# Patient Record
Sex: FEMALE | Race: WHITE | Employment: FULL TIME | URBAN - METROPOLITAN AREA
[De-identification: names, ages, dates, MRNs, and addresses within clinical notes are randomized per-mention and may not be internally consistent; named-entity substitution may affect disease eponyms.]

---

## 2017-02-16 ENCOUNTER — CARE COORDINATION (OUTPATIENT)
Dept: PULMONOLOGY | Facility: CLINIC | Age: 57
End: 2017-02-16

## 2017-02-16 DIAGNOSIS — J45.901 ASTHMA EXACERBATION: Primary | ICD-10-CM

## 2017-02-16 RX ORDER — PREDNISONE 20 MG/1
TABLET ORAL
Qty: 10 TABLET | Refills: 0 | Status: SHIPPED | OUTPATIENT
Start: 2017-02-16 | End: 2017-05-23

## 2017-02-16 NOTE — PROGRESS NOTES
Patient called stating that she was having an asthma flair and wanted a prescription for a prednisone burst.  Discussed with Dr. Perlman who ordered prednisone 40 mg daily for five days.  Patient is in agreement with this plan and verbalizes understanding.

## 2017-05-23 ENCOUNTER — OFFICE VISIT (OUTPATIENT)
Dept: RHEUMATOLOGY | Facility: CLINIC | Age: 57
End: 2017-05-23
Attending: INTERNAL MEDICINE
Payer: COMMERCIAL

## 2017-05-23 VITALS
BODY MASS INDEX: 23.06 KG/M2 | SYSTOLIC BLOOD PRESSURE: 113 MMHG | HEIGHT: 64 IN | OXYGEN SATURATION: 94 % | DIASTOLIC BLOOD PRESSURE: 73 MMHG | HEART RATE: 84 BPM | WEIGHT: 135.1 LBS

## 2017-05-23 DIAGNOSIS — J45.30 MILD PERSISTENT ASTHMA, UNCOMPLICATED: ICD-10-CM

## 2017-05-23 DIAGNOSIS — R12 HEARTBURN: Primary | ICD-10-CM

## 2017-05-23 DIAGNOSIS — M30.1 CHURG-STRAUSS SYNDROME (H): ICD-10-CM

## 2017-05-23 DIAGNOSIS — D72.18 CHURG-STRAUSS SYNDROME (H): ICD-10-CM

## 2017-05-23 DIAGNOSIS — Z79.60 LONG-TERM USE OF IMMUNOSUPPRESSANT MEDICATION: ICD-10-CM

## 2017-05-23 DIAGNOSIS — J45.901 ASTHMA EXACERBATION: ICD-10-CM

## 2017-05-23 LAB
ALBUMIN SERPL-MCNC: 4.1 G/DL (ref 3.4–5)
ALBUMIN UR-MCNC: NEGATIVE MG/DL
ALT SERPL W P-5'-P-CCNC: 18 U/L (ref 0–50)
APPEARANCE UR: CLEAR
AST SERPL W P-5'-P-CCNC: 20 U/L (ref 0–45)
BASOPHILS # BLD AUTO: 0.1 10E9/L (ref 0–0.2)
BASOPHILS NFR BLD AUTO: 1.1 %
BILIRUB UR QL STRIP: NEGATIVE
COLOR UR AUTO: COLORLESS
CREAT SERPL-MCNC: 0.88 MG/DL (ref 0.52–1.04)
DIFFERENTIAL METHOD BLD: ABNORMAL
EOSINOPHIL # BLD AUTO: 1 10E9/L (ref 0–0.7)
EOSINOPHIL NFR BLD AUTO: 12.7 %
ERYTHROCYTE [DISTWIDTH] IN BLOOD BY AUTOMATED COUNT: 13.6 % (ref 10–15)
GFR SERPL CREATININE-BSD FRML MDRD: 66 ML/MIN/1.7M2
GLUCOSE UR STRIP-MCNC: NEGATIVE MG/DL
HCT VFR BLD AUTO: 48.9 % (ref 35–47)
HGB BLD-MCNC: 16.2 G/DL (ref 11.7–15.7)
HGB UR QL STRIP: NEGATIVE
IMM GRANULOCYTES # BLD: 0 10E9/L (ref 0–0.4)
IMM GRANULOCYTES NFR BLD: 0.1 %
KETONES UR STRIP-MCNC: NEGATIVE MG/DL
LEUKOCYTE ESTERASE UR QL STRIP: NEGATIVE
LYMPHOCYTES # BLD AUTO: 0.4 10E9/L (ref 0.8–5.3)
LYMPHOCYTES NFR BLD AUTO: 5.7 %
MCH RBC QN AUTO: 31 PG (ref 26.5–33)
MCHC RBC AUTO-ENTMCNC: 33.1 G/DL (ref 31.5–36.5)
MCV RBC AUTO: 94 FL (ref 78–100)
MONOCYTES # BLD AUTO: 0.6 10E9/L (ref 0–1.3)
MONOCYTES NFR BLD AUTO: 8.4 %
NEUTROPHILS # BLD AUTO: 5.5 10E9/L (ref 1.6–8.3)
NEUTROPHILS NFR BLD AUTO: 72 %
NITRATE UR QL: NEGATIVE
NRBC # BLD AUTO: 0 10*3/UL
NRBC BLD AUTO-RTO: 0 /100
PH UR STRIP: 7 PH (ref 5–7)
PLATELET # BLD AUTO: 215 10E9/L (ref 150–450)
RBC # BLD AUTO: 5.23 10E12/L (ref 3.8–5.2)
RBC #/AREA URNS AUTO: 0 /HPF (ref 0–2)
SP GR UR STRIP: 1 (ref 1–1.03)
URN SPEC COLLECT METH UR: ABNORMAL
UROBILINOGEN UR STRIP-MCNC: 0 MG/DL (ref 0–2)
WBC # BLD AUTO: 7.6 10E9/L (ref 4–11)
WBC #/AREA URNS AUTO: 1 /HPF (ref 0–2)

## 2017-05-23 PROCEDURE — 82565 ASSAY OF CREATININE: CPT | Performed by: INTERNAL MEDICINE

## 2017-05-23 PROCEDURE — 36415 COLL VENOUS BLD VENIPUNCTURE: CPT | Performed by: INTERNAL MEDICINE

## 2017-05-23 PROCEDURE — 99212 OFFICE O/P EST SF 10 MIN: CPT | Mod: ZF

## 2017-05-23 PROCEDURE — 84460 ALANINE AMINO (ALT) (SGPT): CPT | Performed by: INTERNAL MEDICINE

## 2017-05-23 PROCEDURE — 85025 COMPLETE CBC W/AUTO DIFF WBC: CPT | Performed by: INTERNAL MEDICINE

## 2017-05-23 PROCEDURE — 84450 TRANSFERASE (AST) (SGOT): CPT | Performed by: INTERNAL MEDICINE

## 2017-05-23 PROCEDURE — 82040 ASSAY OF SERUM ALBUMIN: CPT | Performed by: INTERNAL MEDICINE

## 2017-05-23 PROCEDURE — 81001 URINALYSIS AUTO W/SCOPE: CPT | Performed by: INTERNAL MEDICINE

## 2017-05-23 RX ORDER — PREDNISONE 5 MG/1
5 TABLET ORAL DAILY
Qty: 90 TABLET | Refills: 1 | Status: SHIPPED | OUTPATIENT
Start: 2017-05-23 | End: 2017-05-31

## 2017-05-23 RX ORDER — AZATHIOPRINE 50 MG/1
50 TABLET ORAL DAILY
Qty: 90 TABLET | Refills: 1 | Status: SHIPPED | OUTPATIENT
Start: 2017-05-23 | End: 2017-05-31

## 2017-05-23 ASSESSMENT — PAIN SCALES - GENERAL: PAINLEVEL: NO PAIN (0)

## 2017-05-23 NOTE — NURSING NOTE
"Chief Complaint   Patient presents with     RECHECK     Follow up for Churg-Kip syndrome       Initial /73  Pulse 84  Ht 1.626 m (5' 4\")  Wt 61.3 kg (135 lb 1.6 oz)  SpO2 94%  BMI 23.19 kg/m2 Estimated body mass index is 23.19 kg/(m^2) as calculated from the following:    Height as of this encounter: 1.626 m (5' 4\").    Weight as of this encounter: 61.3 kg (135 lb 1.6 oz).  Medication Reconciliation: complete   Mary Carpenter CMA    "

## 2017-05-23 NOTE — MR AVS SNAPSHOT
After Visit Summary   5/23/2017    Angelina Trinh    MRN: [id number]           Patient Information     Date Of Birth          1960        Visit Information        Provider Department      5/23/2017 7:30 AM Master Mendieta MD [facility]        Today's Diagnoses     Heartburn    -  1    Asthma exacerbation        Churg-Kip syndrome (H)        Long-term use of immunosuppressant medication        Mild persistent asthma, uncomplicated           Follow-ups after your visit        Follow-up notes from your care team     Return in about 6 months (around 11/23/2017).      Your next 10 appointments already scheduled     May 23, 2017 10:45 AM CDT   Lab with  LAB   [facility] ([facility])    [address] [place] [id number]   [phone]            Aug 15, 2017  3:00 PM CDT   PFT VISIT with  PFL B   [facility] ([facility])    [address] [place] [id number]   [phone]            Aug 15, 2017  3:30 PM CDT   (Arrive by 3:15 PM)   Return Interstitial Lung with David Morris Perlman, MD   [facility] ([facility])    [address] [place] [id number] [phone]            Nov 14, 2017  9:30 AM CST   (Arrive by 9:15 AM)   Return Visit with Master Mendieta MD   [facility] ([facility])    [address] [place] [id number] [phone]              Who to contact     If you have questions or need follow up information about today's clinic visit or your schedule please contact [facility] directly at [phone].  Normal or non-critical lab and imaging results will be communicated to you by MyChart, letter or phone within 4 business days after the clinic has received the results. If you do not hear from us  "within 7 days, please contact the clinic through [employer] or phone. If you have a critical or abnormal lab result, we will notify you by phone as soon as possible.  Submit refill requests through [employer] or call your pharmacy and they will forward the refill request to us. Please allow 3 business days for your refill to be completed.          Additional Information About Your Visit        Bringg[employer]mbel Information     [employer] gives you secure access to your electronic health record. If you see a primary care provider, you can also send messages to your care team and make appointments. If you have questions, please call your primary care clinic.  If you do not have a primary care provider, please call [phone] and they will assist you.        Care EveryWhere ID     This is your Care EveryWhere ID. This could be used by other organizations to access your [place] medical records  [id number]        Your Vitals Were     Pulse Height Pulse Oximetry BMI (Body Mass Index)          84 1.626 m (5' 4\") 94% 23.19 kg/m2         Blood Pressure from Last 3 Encounters:   05/23/17 113/73   11/22/16 131/84   11/10/16 136/74    Weight from Last 3 Encounters:   05/23/17 61.3 kg (135 lb 1.6 oz)   11/22/16 60.9 kg (134 lb 3.2 oz)   11/10/16 61.7 kg (136 lb)              Today, you had the following     No orders found for display         Today's Medication Changes          These changes are accurate as of: 5/23/17 10:20 AM.  If you have any questions, ask your nurse or doctor.               Start taking these medicines.        Dose/Directions    omeprazole 20 MG CR capsule   Commonly known as:  priLOSEC   Used for:  Asthma exacerbation, Churg-Kip syndrome (H), Long-term use of immunosuppressant medication, Mild persistent asthma, uncomplicated, Heartburn   Started by:  Master Mendieta MD        Dose:  20 mg   Take 1 capsule (20 mg) by mouth daily   Quantity:  30 capsule   Refills:  0            Where to get your medicines    "   These medications were sent to [facility]/pharmacy #[id number] - [place], [place] - [address]  [address], [facility] [address]     Phone:  [phone]     azaTHIOprine 50 MG tablet    omeprazole 20 MG CR capsule    predniSONE 5 MG tablet                Primary Care Provider Office Phone # Fax #    John Smart [phone]       [place] [address] [address]        Thank you!     Thank you for choosing [facility] RHEUMATOLOG[facility]  for your care. Our goal is always to provide you with excellent care. Hearing back from our patients is one way we can continue to improve our services. Please take a few minutes to complete the written survey that you may receive in the mail after your visit with us. Thank you!             Your Updated Medication List - Protect others around you: Learn how to safely use, store and throw away your medicines at www.disposemymeds.org.          This list is accurate as of: 5/23/17 10:20 AM.  Always use your most recent med list.                   Brand Name Dispense Instructions for use    albuterol 108 (90 BASE) MCG/ACT Inhaler    albuterol    1 Inhaler    Inhale 1-2 puffs into the lungs every 6 hours as needed for shortness of breath / dyspnea Every 4-6 hours as needed and directed       ALLEGRA 180 MG tablet   Generic drug:  fexofenadine      Take 1 tablet by mouth daily as needed.       azaTHIOprine 50 MG tablet    IMURAN    90 tablet    Take 1 tablet (50 mg) by mouth daily TAKE BY MOUTH WITH FOOD.       budesonide 0.5 MG/2ML neb solution    PULMICORT    180 mL    Take 2 mLs by nebulization 2 times daily. Mix with NS and use as directed with Sinugator       fluticasone 220 MCG/ACT Inhaler    FLOVENT HFA    3 Inhaler    Inhale 2 puffs into the lungs 2 times daily 2 puffs       ipratropium 0.06 % spray    ATROVENT    1 Box    Spray 2 sprays into both nostrils 4 times daily as needed for rhinitis       omeprazole 20 MG CR capsule     priLOSEC    30 capsule    Take 1 capsule (20 mg) by mouth daily       predniSONE 5 MG tablet    DELTASONE    90 tablet    Take 1 tablet (5 mg) by mouth daily       vitamin D 2000 UNITS tablet      Take 1 tablet by mouth daily.       ZANTAC 75 PO      Take 1 tablet by mouth daily

## 2017-05-23 NOTE — PROGRESS NOTES
Ms. 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 This has been associated with asthma, eosinophilia (26%), chronic sinusitis and allergic rhinitis. P-ANCA positive. Treatment with Solumedrol 1 gm IV x 2 and Cyclophosphamide 1.25 gm IV infusion beginning on 12/3/2004, and discontinued after three doses.  She continues the azathioprine 50 mg daily for increases in eosinophilia.     Influenza A and sinus infection in the interval. She has a common cold today. She has some wheezing with the seasonal allergies, but no real SOB.     No complaints of loss of sensation or weakness. No joint, muscle or nerve pains.     She is now exercising regularly. She is eating a bit more and has gained some weight. She has changed her work situation recently, and now is floating. She seems to be having more night time heartburn, now daily.    Azathioprine 50 mg daily is well tolerated with infections as described. Prednisone is 5 mg to maintain good breathing function and control eosinophilia.    DEXA is planned for next month. She needs a colonoscopy.    PMH   Medical: Asthma since  requiring high dose prednisone at times. Chronic sinusitis and allergic/eosinophillic rhinitis for many years, Scoliosis, nasal polyps, ocular migraine, GERD, tympanic membrane rupture, posterior vitreous detachment right eye  Surgery: Cervical colposcopy; T and A; nasal polypectomy  Injuries: clavical fracture, rib fracture.      Family Hx   No asthma or inflammatory disease.  Father with type II diabetes, adult onset hydrocephalus, TIA/CVA, osteoarthritis  Brother and mother both  with brain tumor.   Aunt with bone cancer.   Sister with Grave's disease.  Sister with premature irritable bowel OA, HTN, and PVCs  Paternal Aunt with PMR.  Great grandmother with RA.     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  She dances.   no children. No EtOH or tobacco. Right handed.    PMSF history personally obtained and updated by me today.     ROS:  +post-menopausal  +daily GERD  +Allergy to PCN and SULFA  Remainder of the 14 point ROS obtained and found negative.     Physical Exam   Constitutional: WD-WN-WG cooperative, NAD  Eyes: PERRLA,  EOMI, anichteric sclera, nl conjunctiva  ENT: mouth clear, normal lips, teeth, gums, and sinus  Neck: no mass or thyroid enlargement, no JVD  Resp: lungs clear to auscultation, normal effort, nl to palpation  CV: RRR, no murmurs, rubs or gallops, no edema  GI: no ABD mass or tenderness, soft  Lymph: no cervical or epitrochlear nodes  MS: All TMJ, neck, shoulder, elbow, wrist, hand, spine, hip, knee, ankle, and foot joints were examined and otherwise found normal. Normal  strength. No active synovitis or deformity. Full strength and ROM.  Skin: no rashes or lesions, nl nails  Neuro: nl sensation and strength  Psych: nl judgement, orientation, memory, affect.    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 No signs of adverse event with the azathioprine and we will continue with this. Low dose prednisone to maintain good lung health.     Long term monitoring of immune suppression-plan laboratory testing today.    Hearburn-now daily and I will treat with daily omeprazole for 1 month.    Bone health-with DEXA planned.    RTC in 6 months.

## 2017-05-23 NOTE — LETTER
2017      RE: Angelina Trinh  [address] [place] [id number]       Ms. 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 This has been associated with asthma, eosinophilia (26%), chronic sinusitis and allergic rhinitis. P-ANCA positive. Treatment with Solumedrol 1 gm IV x 2 and Cyclophosphamide 1.25 gm IV infusion beginning on 12/3/2004, and discontinued after three doses.  She continues the azathioprine 50 mg daily for increases in eosinophilia.     Influenza A and sinus infection in the interval. She has a common cold today. She has some wheezing with the seasonal allergies, but no real SOB.     No complaints of loss of sensation or weakness. No joint, muscle or nerve pains.     She is now exercising regularly. She is eating a bit more and has gained some weight. She has changed her work situation recently, and now is floating. She seems to be having more night time heartburn, now daily.    Azathioprine 50 mg daily is well tolerated with infections as described. Prednisone is 5 mg to maintain good breathing function and control eosinophilia.    DEXA is planned for next month. She needs a colonoscopy.    PMH   Medical: Asthma since  requiring high dose prednisone at times. Chronic sinusitis and allergic/eosinophillic rhinitis for many years, Scoliosis, nasal polyps, ocular migraine, GERD, tympanic membrane rupture, posterior vitreous detachment right eye  Surgery: Cervical colposcopy; T and A; nasal polypectomy  Injuries: clavical fracture, rib fracture.      Family Hx   No asthma or inflammatory disease.  Father with type II diabetes, adult onset hydrocephalus, TIA/CVA, osteoarthritis  Brother and mother both  with brain tumor.   Aunt with bone cancer.   Sister with Grave's disease.  Sister with premature irritable bowel OA, HTN, and PVCs  Paternal Aunt with  PMR.  Great grandmother with RA.     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   no children. No EtOH or tobacco. Right handed.    PMSF history personally obtained and updated by me today.     ROS:  +post-menopausal  +daily GERD  +Allergy to PCN and SULFA  Remainder of the 14 point ROS obtained and found negative.     Physical Exam   Constitutional: WD-WN-WG cooperative, NAD  Eyes: PERRLA,  EOMI, anichteric sclera, nl conjunctiva  ENT: mouth clear, normal lips, teeth, gums, and sinus  Neck: no mass or thyroid enlargement, no JVD  Resp: lungs clear to auscultation, normal effort, nl to palpation  CV: RRR, no murmurs, rubs or gallops, no edema  GI: no ABD mass or tenderness, soft  Lymph: no cervical or epitrochlear nodes  MS: All TMJ, neck, shoulder, elbow, wrist, hand, spine, hip, knee, ankle, and foot joints were examined and otherwise found normal. Normal  strength. No active synovitis or deformity. Full strength and ROM.  Skin: no rashes or lesions, nl nails  Neuro: nl sensation and strength  Psych: nl judgement, orientation, memory, affect.    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 No signs of adverse event with the azathioprine and we will continue with this. Low dose prednisone to maintain good lung health.     Long term monitoring of immune suppression-plan laboratory testing today.    Hearburn-now daily and I will treat with daily omeprazole for 1 month.    Bone health-with DEXA planned.    RTC in 6 months.      Master Mendieta MD

## 2017-05-30 ENCOUNTER — MYC MEDICAL ADVICE (OUTPATIENT)
Dept: RHEUMATOLOGY | Facility: CLINIC | Age: 57
End: 2017-05-30

## 2017-05-30 DIAGNOSIS — M30.1 CHURG-STRAUSS SYNDROME (H): ICD-10-CM

## 2017-05-30 DIAGNOSIS — J45.901 ASTHMA EXACERBATION: ICD-10-CM

## 2017-05-30 DIAGNOSIS — Z79.60 LONG-TERM USE OF IMMUNOSUPPRESSANT MEDICATION: ICD-10-CM

## 2017-05-30 DIAGNOSIS — D72.18 CHURG-STRAUSS SYNDROME (H): ICD-10-CM

## 2017-05-30 DIAGNOSIS — R12 HEARTBURN: ICD-10-CM

## 2017-05-30 DIAGNOSIS — J45.30 MILD PERSISTENT ASTHMA, UNCOMPLICATED: ICD-10-CM

## 2017-06-01 RX ORDER — AZATHIOPRINE 50 MG/1
50 TABLET ORAL DAILY
Qty: 90 TABLET | Refills: 1 | Status: SHIPPED | OUTPATIENT
Start: 2017-06-01 | End: 2017-11-14

## 2017-06-01 RX ORDER — PREDNISONE 5 MG/1
5 TABLET ORAL DAILY
Qty: 90 TABLET | Refills: 1 | Status: SHIPPED | OUTPATIENT
Start: 2017-06-01 | End: 2017-11-14

## 2017-07-20 DIAGNOSIS — M30.1 CHURG-STRAUSS SYNDROME (H): ICD-10-CM

## 2017-07-20 DIAGNOSIS — J84.9 ILD (INTERSTITIAL LUNG DISEASE) (H): ICD-10-CM

## 2017-07-20 DIAGNOSIS — D72.18 CHURG-STRAUSS SYNDROME (H): ICD-10-CM

## 2017-07-20 RX ORDER — FLUTICASONE PROPIONATE 220 UG/1
2 AEROSOL, METERED RESPIRATORY (INHALATION) 2 TIMES DAILY
Qty: 3 INHALER | Refills: 3 | Status: SHIPPED | OUTPATIENT
Start: 2017-07-20 | End: 2017-10-17 | Stop reason: DRUGHIGH

## 2017-08-19 ENCOUNTER — HEALTH MAINTENANCE LETTER (OUTPATIENT)
Age: 57
End: 2017-08-19

## 2017-10-06 ENCOUNTER — OFFICE VISIT (OUTPATIENT)
Dept: PULMONOLOGY | Facility: CLINIC | Age: 57
End: 2017-10-06
Attending: INTERNAL MEDICINE
Payer: COMMERCIAL

## 2017-10-06 VITALS
DIASTOLIC BLOOD PRESSURE: 78 MMHG | WEIGHT: 134 LBS | HEART RATE: 88 BPM | BODY MASS INDEX: 22.88 KG/M2 | OXYGEN SATURATION: 96 % | SYSTOLIC BLOOD PRESSURE: 125 MMHG | RESPIRATION RATE: 16 BRPM | TEMPERATURE: 98.1 F | HEIGHT: 64 IN

## 2017-10-06 DIAGNOSIS — J84.9 ILD (INTERSTITIAL LUNG DISEASE) (H): ICD-10-CM

## 2017-10-06 DIAGNOSIS — J45.40 MODERATE PERSISTENT ASTHMA WITHOUT COMPLICATION: ICD-10-CM

## 2017-10-06 DIAGNOSIS — J45.40 MODERATE PERSISTENT ASTHMA WITHOUT COMPLICATION: Primary | ICD-10-CM

## 2017-10-06 PROCEDURE — 96372 THER/PROPH/DIAG INJ SC/IM: CPT | Mod: ZF

## 2017-10-06 PROCEDURE — 99212 OFFICE O/P EST SF 10 MIN: CPT | Mod: ZF

## 2017-10-06 PROCEDURE — 90686 IIV4 VACC NO PRSV 0.5 ML IM: CPT | Mod: ZF | Performed by: INTERNAL MEDICINE

## 2017-10-06 PROCEDURE — G0008 ADMIN INFLUENZA VIRUS VAC: HCPCS | Mod: ZF

## 2017-10-06 PROCEDURE — 25000128 H RX IP 250 OP 636: Mod: ZF | Performed by: INTERNAL MEDICINE

## 2017-10-06 RX ORDER — ALBUTEROL SULFATE 90 UG/1
1-2 AEROSOL, METERED RESPIRATORY (INHALATION) EVERY 6 HOURS PRN
Qty: 1 INHALER | Refills: 11 | Status: SHIPPED | OUTPATIENT
Start: 2017-10-06 | End: 2020-03-12

## 2017-10-06 RX ORDER — BUDESONIDE AND FORMOTEROL FUMARATE DIHYDRATE 160; 4.5 UG/1; UG/1
2 AEROSOL RESPIRATORY (INHALATION) 2 TIMES DAILY
Qty: 1 INHALER | Refills: 6 | Status: SHIPPED | OUTPATIENT
Start: 2017-10-06 | End: 2017-10-17

## 2017-10-06 RX ADMIN — INFLUENZA A VIRUS A/MICHIGAN/45/2015 X-275 (H1N1) ANTIGEN (FORMALDEHYDE INACTIVATED), INFLUENZA A VIRUS A/HONG KONG/4801/2014 X-263B (H3N2) ANTIGEN (FORMALDEHYDE INACTIVATED), INFLUENZA B VIRUS B/PHUKET/3073/2013 ANTIGEN (FORMALDEHYDE INACTIVATED), AND INFLUENZA B VIRUS B/BRISBANE/60/2008 ANTIGEN (FORMALDEHYDE INACTIVATED) 0.5 ML: 15; 15; 15; 15 INJECTION, SUSPENSION INTRAMUSCULAR at 07:36

## 2017-10-06 ASSESSMENT — PAIN SCALES - GENERAL: PAINLEVEL: NO PAIN (0)

## 2017-10-06 NOTE — MR AVS SNAPSHOT
After Visit Summary   10/6/2017    Angelina Trinh    MRN: [id number]           Patient Information     Date Of Birth          1960        Visit Information        Provider Department      10/6/2017 7:00 AM Perlman, David Morris, MD [facility]        Today's Diagnoses     Moderate persistent asthma without complication    -  1    ILD (interstitial lung disease) (H)           Follow-ups after your visit        Your next 10 appointments already scheduled     Nov 14, 2017  7:30 AM CST   (Arrive by 7:15 AM)   Return Visit with Master Mendieta MD   [facility] ([facility])    [address] [place] [id number] [phone]              Future tests that were ordered for you today     Open Future Orders        Priority Expected Expires Ordered    General PFT Lab (Please always keep checked) Routine  10/6/2018 10/6/2017    Pulmonary Function Test Routine  12/23/2026 10/6/2017    XR Chest 2 Views Routine 1/6/2018 10/6/2018 10/6/2017            Who to contact     If you have questions or need follow up information about today's clinic visit or your schedule please contact [facility] directly at [phone].  Normal or non-critical lab and imaging results will be communicated to you by MyChart, letter or phone within 4 business days after the clinic has received the results. If you do not hear from us within 7 days, please contact the clinic through idiohart or phone. If you have a critical or abnormal lab result, we will notify you by phone as soon as possible.  Submit refill requests through [employer]iry or call your pharmacy and they will forward the refill request to us. Please allow 3 business days for your refill to be completed.          Additional Information About Your Visit        idioharTigerstripe Information     [employer]iry gives you secure access to your electronic health record. If you  "see a primary care provider, you can also send messages to your care team and make appointments. If you have questions, please call your primary care clinic.  If you do not have a primary care provider, please call [phone] and they will assist you.        Care EveryWhere ID     This is your Care EveryWhere ID. This could be used by other organizations to access your [place] medical records  [id number]        Your Vitals Were     Pulse Temperature Respirations Height Pulse Oximetry BMI (Body Mass Index)    88 98.1  F (36.7  C) (Oral) 16 1.626 m (5' 4\") 96% 23 kg/m2       Blood Pressure from Last 3 Encounters:   10/06/17 125/78   05/23/17 113/73   11/22/16 131/84    Weight from Last 3 Encounters:   10/06/17 60.8 kg (134 lb)   05/23/17 61.3 kg (135 lb 1.6 oz)   11/22/16 60.9 kg (134 lb 3.2 oz)                 Today's Medication Changes          These changes are accurate as of: 10/6/17 11:40 AM.  If you have any questions, ask your nurse or doctor.               Start taking these medicines.        Dose/Directions    budesonide-formoterol 160-4.5 MCG/ACT Inhaler   Commonly known as:  SYMBICORT   Used for:  Moderate persistent asthma without complication   Started by:  Perlman, David Morris, MD        Dose:  2 puff   Inhale 2 puffs into the lungs 2 times daily   Quantity:  1 Inhaler   Refills:  6            Where to get your medicines      These medications were sent to [facility]/pharmacy #[id number] - [address] [address]     Phone:  [phone]     albuterol 108 (90 BASE) MCG/ACT Inhaler    budesonide-formoterol 160-4.5 MCG/ACT Inhaler                Primary Care Provider Office Phone # Fax #    John Smart [phone]       [address] [address]        Equal Access to Services     SANDY HACKETT AH: Debbie Montemayor, jenny farfan, neal floressh " lakinsey morel [facility] [phone].    ATENCIÓN: Si vijayla xochilt, tiene a adams disposición servicios gratuitos de asistencia lingüística. Sydney watkins [phone].    We comply with applicable federal civil rights laws and [place] laws. We do not discriminate on the basis of race, color, national origin, age, disability, sex, sexual orientation, or gender identity.            Thank you!     Thank you for choosing [facility]  for your care. Our goal is always to provide you with excellent care. Hearing back from our patients is one way we can continue to improve our services. Please take a few minutes to complete the written survey that you may receive in the mail after your visit with us. Thank you!             Your Updated Medication List - Protect others around you: Learn how to safely use, store and throw away your medicines at www.disposemymeds.org.          This list is accurate as of: 10/6/17 11:40 AM.  Always use your most recent med list.                   Brand Name Dispense Instructions for use Diagnosis    albuterol 108 (90 BASE) MCG/ACT Inhaler    PROAIR HFA    1 Inhaler    Inhale 1-2 puffs into the lungs every 6 hours as needed for shortness of breath / dyspnea Every 4-6 hours as needed and directed    ILD (interstitial lung disease) (H)       ALLEGRA 180 MG tablet   Generic drug:  fexofenadine      Take 1 tablet by mouth daily as needed.        azaTHIOprine 50 MG tablet    IMURAN    90 tablet    Take 1 tablet (50 mg) by mouth daily TAKE BY MOUTH WITH FOOD.    Churg-Kip syndrome (H), Long-term use of immunosuppressant medication, Mild persistent asthma, uncomplicated, Asthma exacerbation       budesonide 0.5 MG/2ML neb solution    PULMICORT    180 mL    Take 2 mLs by nebulization 2 times daily. Mix with NS and use as directed with Sinugator    Churg-Kip syndrome (H)       budesonide-formoterol 160-4.5 MCG/ACT Inhaler    SYMBICORT    1 Inhaler    Inhale 2 puffs into the  lungs 2 times daily    Moderate persistent asthma without complication       fluticasone 220 MCG/ACT Inhaler    FLOVENT HFA    3 Inhaler    Inhale 2 puffs into the lungs 2 times daily 2 puffs    Churg-Kip syndrome (H), ILD (interstitial lung disease) (H)       ipratropium 0.06 % spray    ATROVENT    1 Box    Spray 2 sprays into both nostrils 4 times daily as needed for rhinitis    Post-nasal drip       omeprazole 20 MG CR capsule    priLOSEC    30 capsule    Take 1 capsule (20 mg) by mouth daily    Asthma exacerbation, Churg-Kip syndrome (H), Long-term use of immunosuppressant medication, Mild persistent asthma, uncomplicated, Heartburn       predniSONE 5 MG tablet    DELTASONE    90 tablet    Take 1 tablet (5 mg) by mouth daily    Churg-Kip syndrome (H), Long-term use of immunosuppressant medication, Mild persistent asthma, uncomplicated, Asthma exacerbation       vitamin D 2000 UNITS tablet      Take 1 tablet by mouth daily.        ZANTAC 75 PO      Take 1 tablet by mouth daily    Asthma exacerbation, Churg-Kip syndrome (H), Long-term use of immunosuppressant medication, Mild persistent asthma, uncomplicated

## 2017-10-06 NOTE — NURSING NOTE
Chief Complaint   Patient presents with     Interstitial Lung Disease (ILD)     Follow up on Angelina and her ILD     Sarabjit Mares CMA at 6:55 AM on 10/6/2017

## 2017-10-06 NOTE — PROGRESS NOTES
Reason for Visit  Angelina Trinh is a 57 year old year old female who is being seen for Asthma and Churg-Kip  ILD HPI    Angelina Trinh is a 57-year-old female with moderate persistent asthma and Churg-Kip vasculitis who is seen today for follow-up. Patient reports that overall she is doing okay although it does seem that she is still somewhat symptomatic from her asthma. She notes it is worse in the morning and comments that she thinks the reason her PFTs are down today is because it's the morning and her worst time of day. She continues on her Flovent inhaler and uses her albuterol inhaler intermittently typically not more than once a day. She continues on 5 g of prednisone and Imuran for her vasculitis. She follows with Dr. Elmer Mendieta in the rheumatology clinic. She is exercising some walking regularly and going to the gym. Generally does not feel limited in terms of her activities of daily living.    Current Outpatient Prescriptions   Medication     budesonide-formoterol (SYMBICORT) 160-4.5 MCG/ACT Inhaler     albuterol (PROAIR HFA) 108 (90 BASE) MCG/ACT Inhaler     fluticasone (FLOVENT HFA) 220 MCG/ACT Inhaler     azaTHIOprine (IMURAN) 50 MG tablet     predniSONE (DELTASONE) 5 MG tablet     omeprazole (PRILOSEC) 20 MG CR capsule     RaNITidine HCl (ZANTAC 75 PO)     ipratropium (ATROVENT) 0.06 % nasal spray     budesonide (PULMICORT) 0.5 MG/2ML nebulizer solution     fexofenadine (ALLEGRA) 180 MG tablet     Cholecalciferol (VITAMIN D) 2000 UNIT tablet     [DISCONTINUED] albuterol (ALBUTEROL) 108 (90 BASE) MCG/ACT inhaler     Current Facility-Administered Medications   Medication     influenza quadrivalent (PF) vacc age 3 yrs and older (FLUZONE or Flulaval) injection 0.5 mL     Allergies   Allergen Reactions     Mold      Penicillins      Pollen Extract      Sulfa Drugs      Past Medical History:   Diagnosis Date     Asthma      Chronic cough      Churg-Kip syndrome with lung involvement (H)   "      No past surgical history on file.    Social History     Social History     Marital status: Single     Spouse name: N/A     Number of children: N/A     Years of education: N/A     Occupational History     Not on file.     Social History Main Topics     Smoking status: Former Smoker     Packs/day: 0.20     Years: 3.00     Types: Cigarettes     Quit date: 1/1/1996     Smokeless tobacco: Never Used     Alcohol use No     Drug use: No     Sexual activity: Not on file     Other Topics Concern     Not on file     Social History Narrative       Family History   Problem Relation Age of Onset     DIABETES Father      CEREBROVASCULAR DISEASE Father      Hypertension Brother      Lipids Brother      Hypertension Sister      Lipids Sister        ROS Pulmonary    A complete ROS was otherwise negative except as noted in the HPI.  Vitals:    10/06/17 0656   BP: 125/78   Pulse: 88   Resp: 16   Temp: 98.1  F (36.7  C)   TempSrc: Oral   SpO2: 96%   Weight: 60.8 kg (134 lb)   Height: 1.626 m (5' 4\")     Exam:   GENERAL APPEARANCE: Well developed, well nourished, alert, and in no apparent distress.  NECK: supple, no masses, no thyromegaly.  LYMPHATICS: No significant axillary, cervical, or supraclavicular nodes.  RESP: good air flow throughout, - occ exp wheeze, mostly coarse, some upper lobe insp wheezes as well.  CV: Normal S1, S2, regular rhythm, normal rate, no rub, no murmur,  no gallop, no LE edema.   ABDOMEN:  Bowel sounds normal, soft, nontender, no HSM or masses.   MS: extremities normal- no clubbing, no cyanosis.  SKIN: no rash on limited exam  NEURO: Mentation intact, speech normal, normal strength and tone, normal gait and stance  PSYCH: mentation appears normal. and affect normal/bright  Results: I have reviewed all imaging, PFTs and other relavent tests, please see below for details, PFT and imaging results were reviewed with the patient.  PFTs: Moderate obstruction, down from previous.  Assessment and plan: "     57-year-old female with moderate persistent asthma. I'm worried that her symptoms are not as well controlled as they could be although she is not using her albuterol inhaler that frequently. Therefore, switch her from Flovent to Symbicort to see if the addition of a long-acting beta agonist helps with her symptoms. Particular with her morning symptoms. I will plan to see her back in 3 months with therapy people may function test will also get a chest x-ray at that time. She'll call sooner with any change in her breathing. Flu shot is administered today.    CBC   Recent Labs   Lab Test  05/23/17   1040  11/22/16   1049   RBC  5.23*  5.00   HGB  16.2*  15.6   HCT  48.9*  45.8   PLT  215  230       Basic Metabolic Panel  No results for input(s): NA, POTASSIUM, CHLORIDE, CO2, BUN, CT, GLC, LEIDA in the last 23220 hours.    INR  No results for input(s): INR in the last 11344 hours.    PFT  PFT Latest Ref Rng & Units 10/6/2017   FVC L 2.59   FEV1 L 1.47   FVC% % 77   FEV1% % 55           CC:

## 2017-10-06 NOTE — LETTER
10/6/2017       RE: Angelina Trinh  [address] [place] [id number]     Dear Colleague,    Thank you for referring your patient, Angelina Trinh, to the [facility] at [facility]. Please see a copy of my visit note below.    Reason for Visit  Angelina Trinh is a 57 year old year old female who is being seen for Asthma and Churg-Kip  ILD HPI    Angelina Trinh is a 57-year-old female with moderate persistent asthma and Churg-Kip vasculitis who is seen today for follow-up. Patient reports that overall she is doing okay although it does seem that she is still somewhat symptomatic from her asthma. She notes it is worse in the morning and comments that she thinks the reason her PFTs are down today is because it's the morning and her worst time of day. She continues on her Flovent inhaler and uses her albuterol inhaler intermittently typically not more than once a day. She continues on 5 g of prednisone and Imuran for her vasculitis. She follows with Dr. Elmer Mendieta in the rheumatology clinic. She is exercising some walking regularly and going to the gym. Generally does not feel limited in terms of her activities of daily living.    Current Outpatient Prescriptions   Medication     budesonide-formoterol (SYMBICORT) 160-4.5 MCG/ACT Inhaler     albuterol (PROAIR HFA) 108 (90 BASE) MCG/ACT Inhaler     fluticasone (FLOVENT HFA) 220 MCG/ACT Inhaler     azaTHIOprine (IMURAN) 50 MG tablet     predniSONE (DELTASONE) 5 MG tablet     omeprazole (PRILOSEC) 20 MG CR capsule     RaNITidine HCl (ZANTAC 75 PO)     ipratropium (ATROVENT) 0.06 % nasal spray     budesonide (PULMICORT) 0.5 MG/2ML nebulizer solution     fexofenadine (ALLEGRA) 180 MG tablet     Cholecalciferol (VITAMIN D) 2000 UNIT tablet     [DISCONTINUED] albuterol (ALBUTEROL) 108 (90 BASE) MCG/ACT inhaler     Current Facility-Administered Medications   Medication     influenza  "quadrivalent (PF) vacc age 3 yrs and older (FLUZONE or Flulaval) injection 0.5 mL     Allergies   Allergen Reactions     Mold      Penicillins      Pollen Extract      Sulfa Drugs      Past Medical History:   Diagnosis Date     Asthma      Chronic cough      Churg-Kip syndrome with lung involvement (H)        No past surgical history on file.    Social History     Social History     Marital status: Single     Spouse name: N/A     Number of children: N/A     Years of education: N/A     Occupational History     Not on file.     Social History Main Topics     Smoking status: Former Smoker     Packs/day: 0.20     Years: 3.00     Types: Cigarettes     Quit date: 1/1/1996     Smokeless tobacco: Never Used     Alcohol use No     Drug use: No     Sexual activity: Not on file     Other Topics Concern     Not on file     Social History Narrative       Family History   Problem Relation Age of Onset     DIABETES Father      CEREBROVASCULAR DISEASE Father      Hypertension Brother      Lipids Brother      Hypertension Sister      Lipids Sister        ROS Pulmonary    A complete ROS was otherwise negative except as noted in the HPI.  Vitals:    10/06/17 0656   BP: 125/78   Pulse: 88   Resp: 16   Temp: 98.1  F (36.7  C)   TempSrc: Oral   SpO2: 96%   Weight: 60.8 kg (134 lb)   Height: 1.626 m (5' 4\")     Exam:   GENERAL APPEARANCE: Well developed, well nourished, alert, and in no apparent distress.  NECK: supple, no masses, no thyromegaly.  LYMPHATICS: No significant axillary, cervical, or supraclavicular nodes.  RESP: good air flow throughout, - occ exp wheeze, mostly coarse, some upper lobe insp wheezes as well.  CV: Normal S1, S2, regular rhythm, normal rate, no rub, no murmur,  no gallop, no LE edema.   ABDOMEN:  Bowel sounds normal, soft, nontender, no HSM or masses.   MS: extremities normal- no clubbing, no cyanosis.  SKIN: no rash on limited exam  NEURO: Mentation intact, speech normal, normal strength and tone, normal " gait and stance  PSYCH: mentation appears normal. and affect normal/bright  Results: I have reviewed all imaging, PFTs and other relavent tests, please see below for details, PFT and imaging results were reviewed with the patient.  PFTs: Moderate obstruction, down from previous.  Assessment and plan:     57-year-old female with moderate persistent asthma. I'm worried that her symptoms are not as well controlled as they could be although she is not using her albuterol inhaler that frequently. Therefore, switch her from Flovent to Symbicort to see if the addition of a long-acting beta agonist helps with her symptoms. Particular with her morning symptoms. I will plan to see her back in 3 months with therapy people may function test will also get a chest x-ray at that time. She'll call sooner with any change in her breathing. Flu shot is administered today.    CBC   Recent Labs   Lab Test  05/23/17   1040  11/22/16   1049   RBC  5.23*  5.00   HGB  16.2*  15.6   HCT  48.9*  45.8   PLT  215  230       Basic Metabolic Panel  No results for input(s): NA, POTASSIUM, CHLORIDE, CO2, BUN, CT, GLC, LEIDA in the last 38617 hours.    INR  No results for input(s): INR in the last 49314 hours.    PFT  PFT Latest Ref Rng & Units 10/6/2017   FVC L 2.59   FEV1 L 1.47   FVC% % 77   FEV1% % 55         Again, thank you for allowing me to participate in the care of your patient.      Sincerely,    David Morris Perlman, MD

## 2017-10-11 DIAGNOSIS — J84.9 ILD (INTERSTITIAL LUNG DISEASE) (H): ICD-10-CM

## 2017-10-11 DIAGNOSIS — M30.1 CHURG-STRAUSS SYNDROME (H): ICD-10-CM

## 2017-10-11 DIAGNOSIS — D72.18 CHURG-STRAUSS SYNDROME (H): ICD-10-CM

## 2017-10-11 RX ORDER — FLUTICASONE PROPIONATE 220 UG/1
2 AEROSOL, METERED RESPIRATORY (INHALATION) 2 TIMES DAILY
Qty: 3 INHALER | Refills: 3 | OUTPATIENT
Start: 2017-10-11

## 2017-10-11 NOTE — TELEPHONE ENCOUNTER
Received fax requesting 90 supply of Flovent 220mcg    Faxed refill request received from [facility].   Mediation Requested:   fluticasone (FLOVENT HFA) 220 MCG/ACT Inhaler 3 Inhaler 3 7/20/2017  No      Sig: Inhale 2 puffs into the lungs 2 times daily 2 puffs       Last Office Visit: 10/6/2017  Next Appointment Scheduled for: NA

## 2017-10-17 DIAGNOSIS — J45.40 MODERATE PERSISTENT ASTHMA WITHOUT COMPLICATION: ICD-10-CM

## 2017-10-17 RX ORDER — BUDESONIDE AND FORMOTEROL FUMARATE DIHYDRATE 160; 4.5 UG/1; UG/1
2 AEROSOL RESPIRATORY (INHALATION) 2 TIMES DAILY
Qty: 1 INHALER | Refills: 6 | Status: SHIPPED | OUTPATIENT
Start: 2017-10-17 | End: 2017-10-24

## 2017-10-18 LAB
DLCOUNC-%PRED-PRE: 106 %
DLCOUNC-PRE: 23.6 ML/MIN/MMHG
DLCOUNC-PRED: 22.26 ML/MIN/MMHG
ERV-%PRED-PRE: 49 %
ERV-PRE: 0.49 L
ERV-PRED: 0.99 L
EXPTIME-PRE: 10.9 SEC
FEF2575-%PRED-PRE: 25 %
FEF2575-PRE: 0.63 L/SEC
FEF2575-PRED: 2.47 L/SEC
FEFMAX-%PRED-PRE: 69 %
FEFMAX-PRE: 4.58 L/SEC
FEFMAX-PRED: 6.57 L/SEC
FEV1-%PRED-PRE: 55 %
FEV1-PRE: 1.47 L
FEV1FEV6-PRE: 59 %
FEV1FEV6-PRED: 81 %
FEV1FVC-PRE: 57 %
FEV1FVC-PRED: 80 %
FEV1SVC-PRE: 63 %
FEV1SVC-PRED: 77 %
FIFMAX-PRE: 5.25 L/SEC
FVC-%PRED-PRE: 77 %
FVC-PRE: 2.59 L
FVC-PRED: 3.34 L
IC-%PRED-PRE: 76 %
IC-PRE: 1.86 L
IC-PRED: 2.43 L
VA-%PRED-PRE: 69 %
VA-PRE: 3.56 L
VC-%PRED-PRE: 68 %
VC-PRE: 2.35 L
VC-PRED: 3.42 L

## 2017-10-24 ENCOUNTER — CARE COORDINATION (OUTPATIENT)
Dept: PULMONOLOGY | Facility: CLINIC | Age: 57
End: 2017-10-24

## 2017-10-24 DIAGNOSIS — J45.909 ASTHMA: Primary | ICD-10-CM

## 2017-10-24 NOTE — PROGRESS NOTES
Patient called in stating insurance would not Symbicort but would Advair. Pt had been on Advair in the past and had issues with higher BP. However, pt states was also on high doses of prednisone during that time so not sure what was the culprit. Pt would like to try Advair again. Advised pt to continue to monitor her BP at home (she has BP machine) and to call if any changes. Pt verbalized understanding. Rx sent to requested pharmacy.   Caterina Gill RN BSN

## 2017-11-14 ENCOUNTER — OFFICE VISIT (OUTPATIENT)
Dept: RHEUMATOLOGY | Facility: CLINIC | Age: 57
End: 2017-11-14
Attending: INTERNAL MEDICINE
Payer: COMMERCIAL

## 2017-11-14 VITALS
WEIGHT: 132 LBS | OXYGEN SATURATION: 97 % | SYSTOLIC BLOOD PRESSURE: 121 MMHG | HEART RATE: 68 BPM | HEIGHT: 64 IN | BODY MASS INDEX: 22.53 KG/M2 | DIASTOLIC BLOOD PRESSURE: 78 MMHG

## 2017-11-14 DIAGNOSIS — J45.30 MILD PERSISTENT ASTHMA, UNSPECIFIED WHETHER COMPLICATED: ICD-10-CM

## 2017-11-14 DIAGNOSIS — Z79.60 LONG-TERM USE OF IMMUNOSUPPRESSANT MEDICATION: ICD-10-CM

## 2017-11-14 DIAGNOSIS — M30.1 CHURG-STRAUSS SYNDROME (H): Primary | ICD-10-CM

## 2017-11-14 DIAGNOSIS — D72.18 CHURG-STRAUSS SYNDROME (H): ICD-10-CM

## 2017-11-14 DIAGNOSIS — D72.18 CHURG-STRAUSS SYNDROME (H): Primary | ICD-10-CM

## 2017-11-14 DIAGNOSIS — M30.1 CHURG-STRAUSS SYNDROME (H): ICD-10-CM

## 2017-11-14 LAB
ALBUMIN SERPL-MCNC: 3.9 G/DL (ref 3.4–5)
ALBUMIN UR-MCNC: NEGATIVE MG/DL
ALT SERPL W P-5'-P-CCNC: 26 U/L (ref 0–50)
APPEARANCE UR: CLEAR
AST SERPL W P-5'-P-CCNC: 20 U/L (ref 0–45)
BASOPHILS # BLD AUTO: 0.1 10E9/L (ref 0–0.2)
BASOPHILS NFR BLD AUTO: 1.1 %
BILIRUB UR QL STRIP: NEGATIVE
COLOR UR AUTO: COLORLESS
CREAT SERPL-MCNC: 0.95 MG/DL (ref 0.52–1.04)
DIFFERENTIAL METHOD BLD: ABNORMAL
EOSINOPHIL # BLD AUTO: 0.8 10E9/L (ref 0–0.7)
EOSINOPHIL NFR BLD AUTO: 10.4 %
ERYTHROCYTE [DISTWIDTH] IN BLOOD BY AUTOMATED COUNT: 13.2 % (ref 10–15)
GFR SERPL CREATININE-BSD FRML MDRD: 61 ML/MIN/1.7M2
GLUCOSE UR STRIP-MCNC: NEGATIVE MG/DL
HCT VFR BLD AUTO: 47 % (ref 35–47)
HGB BLD-MCNC: 15.7 G/DL (ref 11.7–15.7)
HGB UR QL STRIP: NEGATIVE
IMM GRANULOCYTES # BLD: 0 10E9/L (ref 0–0.4)
IMM GRANULOCYTES NFR BLD: 0.3 %
KETONES UR STRIP-MCNC: NEGATIVE MG/DL
LEUKOCYTE ESTERASE UR QL STRIP: NEGATIVE
LYMPHOCYTES # BLD AUTO: 0.9 10E9/L (ref 0.8–5.3)
LYMPHOCYTES NFR BLD AUTO: 11.7 %
MCH RBC QN AUTO: 31.2 PG (ref 26.5–33)
MCHC RBC AUTO-ENTMCNC: 33.4 G/DL (ref 31.5–36.5)
MCV RBC AUTO: 93 FL (ref 78–100)
MONOCYTES # BLD AUTO: 0.4 10E9/L (ref 0–1.3)
MONOCYTES NFR BLD AUTO: 4.7 %
MUCOUS THREADS #/AREA URNS LPF: PRESENT /LPF
NEUTROPHILS # BLD AUTO: 5.3 10E9/L (ref 1.6–8.3)
NEUTROPHILS NFR BLD AUTO: 71.8 %
NITRATE UR QL: NEGATIVE
NRBC # BLD AUTO: 0 10*3/UL
NRBC BLD AUTO-RTO: 0 /100
PH UR STRIP: 6 PH (ref 5–7)
PLATELET # BLD AUTO: 281 10E9/L (ref 150–450)
RBC # BLD AUTO: 5.04 10E12/L (ref 3.8–5.2)
RBC #/AREA URNS AUTO: <1 /HPF (ref 0–2)
SOURCE: ABNORMAL
SP GR UR STRIP: 1 (ref 1–1.03)
UROBILINOGEN UR STRIP-MCNC: 0 MG/DL (ref 0–2)
WBC # BLD AUTO: 7.4 10E9/L (ref 4–11)
WBC #/AREA URNS AUTO: 0 /HPF (ref 0–2)

## 2017-11-14 PROCEDURE — 81001 URINALYSIS AUTO W/SCOPE: CPT | Performed by: INTERNAL MEDICINE

## 2017-11-14 PROCEDURE — 36415 COLL VENOUS BLD VENIPUNCTURE: CPT | Performed by: INTERNAL MEDICINE

## 2017-11-14 PROCEDURE — 84450 TRANSFERASE (AST) (SGOT): CPT | Performed by: INTERNAL MEDICINE

## 2017-11-14 PROCEDURE — 82565 ASSAY OF CREATININE: CPT | Performed by: INTERNAL MEDICINE

## 2017-11-14 PROCEDURE — 85025 COMPLETE CBC W/AUTO DIFF WBC: CPT | Performed by: INTERNAL MEDICINE

## 2017-11-14 PROCEDURE — 84460 ALANINE AMINO (ALT) (SGPT): CPT | Performed by: INTERNAL MEDICINE

## 2017-11-14 PROCEDURE — 99212 OFFICE O/P EST SF 10 MIN: CPT | Mod: ZF

## 2017-11-14 PROCEDURE — 82040 ASSAY OF SERUM ALBUMIN: CPT | Performed by: INTERNAL MEDICINE

## 2017-11-14 RX ORDER — AZATHIOPRINE 50 MG/1
50 TABLET ORAL DAILY
Qty: 90 TABLET | Refills: 1 | Status: SHIPPED | OUTPATIENT
Start: 2017-11-14 | End: 2018-05-15

## 2017-11-14 RX ORDER — PREDNISONE 1 MG/1
TABLET ORAL
Qty: 120 TABLET | Refills: 2 | Status: SHIPPED | OUTPATIENT
Start: 2017-11-14 | End: 2018-05-15

## 2017-11-14 ASSESSMENT — PAIN SCALES - GENERAL: PAINLEVEL: NO PAIN (0)

## 2017-11-14 NOTE — NURSING NOTE
"Chief Complaint   Patient presents with     RECHECK     Follow up for Churg-Kip syndrome       Initial /78  Pulse 68  Ht 1.619 m (5' 3.75\")  Wt 59.9 kg (132 lb)  SpO2 97%  BMI 22.83 kg/m2 Estimated body mass index is 22.83 kg/(m^2) as calculated from the following:    Height as of this encounter: 1.619 m (5' 3.75\").    Weight as of this encounter: 59.9 kg (132 lb).  Medication Reconciliation: complete   Mary Carpenter CMA    "

## 2017-11-14 NOTE — LETTER
"11/14/2017      RE: Angelina Trinh  [address] [place] [id number]       Ms. 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 This has been associated with asthma, eosinophilia (26%), chronic sinusitis and allergic rhinitis. P-ANCA positive. Treatment with Solumedrol 1 gm IV x 2 and Cyclophosphamide 1.25 gm IV infusion beginning on 12/3/2004, and discontinued after three doses.  She continues the azathioprine 50 mg daily for increases in eosinophilia.     Saw Dr. Sandoval in October and was switched to advair with improvement in SOB. Her energy level is up and she is working out at the gym regularly (~3X/week) with weight bearing exercises. She is eating healthier and has improved her sleep. She is having less stress at work. Overall, she is very happy with how she is doing.     She had a 2 shave biopsies done by derm on her hands, with results still pending.     She had a sinus infection that cleared without antibiotics in the last few weeks, but she has not otherwise had any infections. This was associated with a temp of 100F. She continues to use a \"sinugator\" to rinse sinuses with good results. She thinks that the advair has also improved her sinus congestion.    She takes calcium as needed, but otherwise has a high dairy diet.     She previously was on fosamax when she was initially diagnosed. She would prefer to taper prednisone rather than re-start bisphosphonate. She stopped HRT at age 53 (was on for 7 years).     PMH   Medical: Asthma since 2003 requiring high dose prednisone at times. Chronic sinusitis and allergic/eosinophillic rhinitis for many years, Scoliosis, nasal polyps, ocular migraine, GERD, tympanic membrane rupture, posterior vitreous detachment right eye  Surgery: Cervical colposcopy; T and A; nasal polypectomy  Injuries: clavical fracture, rib fracture.   "    Family Hx   No asthma or inflammatory disease.  Father with type II diabetes, adult onset hydrocephalus, TIA/CVA, osteoarthritis  Brother and mother both  with brain tumor.   Aunt with bone cancer.   Sister with Grave's disease.  Sister with premature irritable bowel OA, HTN, and PVCs  Paternal Aunt with PMR.  Great grandmother with RA.     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  She dances.   no children. No EtOH or tobacco. Right handed.    PMSF history personally obtained and updated by me today.     ROS:  +post-menopausal  +daily GERD  +Allergy to PCN and SULFA  Remainder of the 14 point ROS obtained and found negative.     Physical Exam   Constitutional: [id number] cooperative, NAD  Eyes: PERRLA,  EOMI, anichteric sclera, nl conjunctiva  ENT: mouth clear, normal lips, teeth, gums, and sinus  Neck: no mass or thyroid enlargement, no JVD  Resp: lungs clear to auscultation, normal effort, nl to palpation  CV: RRR, no murmurs, rubs or gallops, no edema  GI: no ABD mass or tenderness, soft  Lymph: no cervical or epitrochlear nodes  MS: All TMJ, neck, shoulder, elbow, wrist, hand, spine, hip, knee, ankle, and foot joints were examined and otherwise found normal. Normal  strength. No active synovitis or deformity. Full strength and ROM.  Skin: no rashes or lesions, nl nails  Neuro: nl sensation and strength  Psych: nl judgement, orientation, memory, affect.    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 No signs of adverse event with the azathioprine and we will continue with this, but Pt will inform us if skin biopsies show neoplasia.     Discussed DEXA scan showing osteopenia with Pt. While the osteopenia is mild, there has been been bone loss since her last DEXA (). Some of this may be attributable to stopping HRT, but certainly there is  concern for bone health as she has been on chronic systemic steroids. She had been on alendronate for ~3 years when she initiated treatment for EGPA, and she would prefer to not go back on a bisphosphonate at this time. Instead we will taper prednisone by 1mg/month until she is off. She will discuss with Dr. Sandoval if a short course of prednisone could be used for flares in SOB.  She has no history of fracture, does not drink or smoke, and has minimal family history of osteoporosis.     Long term monitoring of immune suppression-plan laboratory testing today.    Continued bone health (Vit D, Ca) with repeat DEXA in 2 years (11/2019)    Labs in 3 months.  RTC in 6 months.    Rheumatology Attending:    This patient was interviewed and examined in the presence of the medical fellow, and this note reflects our mutual impression.     Master Mendieta MD  Professor of Medicine  Director, Division of Rheumatic and Autoimmune Diseases

## 2017-11-14 NOTE — MR AVS SNAPSHOT
After Visit Summary   11/14/2017    Angelina Trinh    MRN: [id number]           Patient Information     Date Of Birth          1960        Visit Information        Provider Department      11/14/2017 7:30 AM Master Mendieta MD [facility] Rheumatology        Today's Diagnoses     Churg-Kip syndrome (H)    -  1    Mild persistent asthma, unspecified whether complicated        Long-term use of immunosuppressant medication           Follow-ups after your visit        Follow-up notes from your care team     Return in about 6 months (around 5/14/2018).      Your next 10 appointments already scheduled     Nov 14, 2017  8:45 AM CST   Lab with  LAB   [facility] Lab ([facility])    [address]  1st Floor  [facility] [address] [phone]            May 15, 2018  7:30 AM CDT   (Arrive by 7:15 AM)   Return Visit with Master Mendieta MD   [facility] Rheumatology ([facility])    [address]  3rd Floor  [facility] [address] [phone]              Future tests that were ordered for you today     Open Standing Orders        Priority Remaining Interval Expires Ordered    CBC with platelets differential Routine 4/4 every 3 months 11/14/2018 11/14/2017    Creatinine Routine 4/4 every 3 months 11/14/2018 11/14/2017    AST Routine 4/4 every 3 months 11/14/2018 11/14/2017    ALT Routine 4/4 every 3 months 11/14/2018 11/14/2017    Albumin level Routine 4/4 every 3 months 11/14/2018 11/14/2017    UA with Microscopic Routine 4/4 every 3 months 11/14/2018 11/14/2017            Who to contact     If you have questions or need follow up information about today's clinic visit or your schedule please contact [facility] RHEUMATOLOGY directly at [phone].  Normal or non-critical lab and imaging results will be communicated to you by MyChart, letter or phone within 4 business days after the clinic has received the results. If you do not  "hear from us within 7 days, please contact the clinic through FireEye or phone. If you have a critical or abnormal lab result, we will notify you by phone as soon as possible.  Submit refill requests through FireEye or call your pharmacy and they will forward the refill request to us. Please allow 3 business days for your refill to be completed.          Additional Information About Your Visit        LaunchRock[employer]Furn[employer]sh Information     FireEye gives you secure access to your electronic health record. If you see a primary care provider, you can also send messages to your care team and make appointments. If you have questions, please call your primary care clinic.  If you do not have a primary care provider, please call [phone] and they will assist you.        Care EveryWhere ID     This is your Care EveryWhere ID. This could be used by other organizations to access your [place] medical records  [id number]        Your Vitals Were     Pulse Height Pulse Oximetry BMI (Body Mass Index)          68 1.619 m (5' 3.75\") 97% 22.83 kg/m2         Blood Pressure from Last 3 Encounters:   11/14/17 121/78   10/06/17 125/78   05/23/17 113/73    Weight from Last 3 Encounters:   11/14/17 59.9 kg (132 lb)   10/06/17 60.8 kg (134 lb)   05/23/17 61.3 kg (135 lb 1.6 oz)                 Today's Medication Changes          These changes are accurate as of: 11/14/17  8:35 AM.  If you have any questions, ask your nurse or doctor.               These medicines have changed or have updated prescriptions.        Dose/Directions    predniSONE 1 MG tablet   Commonly known as:  DELTASONE   This may have changed:    - medication strength  - how much to take  - how to take this  - when to take this  - additional instructions   Used for:  Churg-Kip syndrome (H), Mild persistent asthma, unspecified whether complicated, Long-term use of immunosuppressant medication   Changed by:  Master Mendieta MD        Take 4 tablets by mouth daily for 1 month, " then 3 daily for 1 month, then 2 daily for 1 month, then 1 daily for 1 month, then stop   Quantity:  120 tablet   Refills:  2            Where to get your medicines      These medications were sent to [facility]/pharmacy #[id number] - [place], [place] - [address]  [address], [facility] [address]     Phone:  [phone]     azaTHIOprine 50 MG tablet    predniSONE 1 MG tablet                Primary Care Provider Office Phone # Fax #    John Smart [phone]       [facility] [address]    [facility] [address]        Equal Access to Services     [facility]: Hadii aad ku hadasho Soomaali, waaxda luqadaha, qaybta kaalmada adesaminayaamanda, neal oseguera . So [facility] [phone].    ATENCIÓN: Si habla español, tiene a adams disposición servicios gratuitos de asistencia lingüística. [facility] [phone].    We comply with applicable federal civil rights laws and [place] laws. We do not discriminate on the basis of race, color, national origin, age, disability, sex, sexual orientation, or gender identity.            Thank you!     Thank you for choosing [facility] RHEUMATOLOGY  for your care. Our goal is always to provide you with excellent care. Hearing back from our patients is one way we can continue to improve our services. Please take a few minutes to complete the written survey that you may receive in the mail after your visit with us. Thank you!             Your Updated Medication List - Protect others around you: Learn how to safely use, store and throw away your medicines at www.disposemymeds.org.          This list is accurate as of: 11/14/17  8:35 AM.  Always use your most recent med list.                   Brand Name Dispense Instructions for use Diagnosis    albuterol 108 (90 BASE) MCG/ACT Inhaler    PROAIR HFA    1 Inhaler    Inhale 1-2 puffs into the lungs every 6 hours as needed for shortness of breath / dyspnea Every 4-6 hours as needed and  directed    ILD (interstitial lung disease) (H)       ALLEGRA 180 MG tablet   Generic drug:  fexofenadine      Take 1 tablet by mouth daily as needed.        azaTHIOprine 50 MG tablet    IMURAN    90 tablet    Take 1 tablet (50 mg) by mouth daily TAKE BY MOUTH WITH FOOD.    Churg-Kip syndrome (H), Long-term use of immunosuppressant medication, Mild persistent asthma, unspecified whether complicated       budesonide 0.5 MG/2ML neb solution    PULMICORT    180 mL    Take 2 mLs by nebulization 2 times daily. Mix with NS and use as directed with Sinugator    Churg-Kip syndrome (H)       fluticasone-salmeterol 250-50 MCG/DOSE diskus inhaler    ADVAIR    60 Inhaler    Inhale 1 puff into the lungs 2 times daily    Asthma       ipratropium 0.06 % spray    ATROVENT    1 Box    Spray 2 sprays into both nostrils 4 times daily as needed for rhinitis    Post-nasal drip       omeprazole 20 MG CR capsule    priLOSEC    30 capsule    Take 1 capsule (20 mg) by mouth daily    Asthma exacerbation, Churg-Kip syndrome (H), Long-term use of immunosuppressant medication, Mild persistent asthma, uncomplicated, Heartburn       predniSONE 1 MG tablet    DELTASONE    120 tablet    Take 4 tablets by mouth daily for 1 month, then 3 daily for 1 month, then 2 daily for 1 month, then 1 daily for 1 month, then stop    Churg-Kip syndrome (H), Mild persistent asthma, unspecified whether complicated, Long-term use of immunosuppressant medication       vitamin D 2000 UNITS tablet      Take 1 tablet by mouth daily.        ZANTAC 75 PO      Take 1 tablet by mouth daily    Asthma exacerbation, Churg-Kip syndrome (H), Long-term use of immunosuppressant medication, Mild persistent asthma, uncomplicated

## 2017-11-14 NOTE — PROGRESS NOTES
"Ms. 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 This has been associated with asthma, eosinophilia (26%), chronic sinusitis and allergic rhinitis. P-ANCA positive. Treatment with Solumedrol 1 gm IV x 2 and Cyclophosphamide 1.25 gm IV infusion beginning on 12/3/2004, and discontinued after three doses.  She continues the azathioprine 50 mg daily for increases in eosinophilia.     Saw Dr. Sandoval in October and was switched to advair with improvement in SOB. Her energy level is up and she is working out at the gym regularly (~3X/week) with weight bearing exercises. She is eating healthier and has improved her sleep. She is having less stress at work. Overall, she is very happy with how she is doing.     She had a 2 shave biopsies done by derm on her hands, with results still pending.     She had a sinus infection that cleared without antibiotics in the last few weeks, but she has not otherwise had any infections. This was associated with a temp of 100F. She continues to use a \"sinugator\" to rinse sinuses with good results. She thinks that the advair has also improved her sinus congestion.    She takes calcium as needed, but otherwise has a high dairy diet.     She previously was on fosamax when she was initially diagnosed. She would prefer to taper prednisone rather than re-start bisphosphonate. She stopped HRT at age 53 (was on for 7 years).     PMH   Medical: Asthma since 2003 requiring high dose prednisone at times. Chronic sinusitis and allergic/eosinophillic rhinitis for many years, Scoliosis, nasal polyps, ocular migraine, GERD, tympanic membrane rupture, posterior vitreous detachment right eye  Surgery: Cervical colposcopy; T and A; nasal polypectomy  Injuries: clavical fracture, rib fracture.      Family Hx   No asthma or inflammatory disease.  Father with type II diabetes, adult " onset hydrocephalus, TIA/CVA, osteoarthritis  Brother and mother both  with brain tumor.   Aunt with bone cancer.   Sister with Grave's disease.  Sister with premature irritable bowel OA, HTN, and PVCs  Paternal Aunt with PMR.  Great grandmother with RA.     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  She dances.   no children. No EtOH or tobacco. Right handed.    PMSF history personally obtained and updated by me today.     ROS:  +post-menopausal  +daily GERD  +Allergy to PCN and SULFA  Remainder of the 14 point ROS obtained and found negative.     Physical Exam   Constitutional: WD-WN-WG cooperative, NAD  Eyes: PERRLA,  EOMI, anichteric sclera, nl conjunctiva  ENT: mouth clear, normal lips, teeth, gums, and sinus  Neck: no mass or thyroid enlargement, no JVD  Resp: lungs clear to auscultation, normal effort, nl to palpation  CV: RRR, no murmurs, rubs or gallops, no edema  GI: no ABD mass or tenderness, soft  Lymph: no cervical or epitrochlear nodes  MS: All TMJ, neck, shoulder, elbow, wrist, hand, spine, hip, knee, ankle, and foot joints were examined and otherwise found normal. Normal  strength. No active synovitis or deformity. Full strength and ROM.  Skin: no rashes or lesions, nl nails  Neuro: nl sensation and strength  Psych: nl judgement, orientation, memory, affect.    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 No signs of adverse event with the azathioprine and we will continue with this, but Pt will inform us if skin biopsies show neoplasia.     Discussed DEXA scan showing osteopenia with Pt. While the osteopenia is mild, there has been been bone loss since her last DEXA (). Some of this may be attributable to stopping HRT, but certainly there is concern for bone health as she has been on chronic systemic steroids. She had been on  alendronate for ~3 years when she initiated treatment for EGPA, and she would prefer to not go back on a bisphosphonate at this time. Instead we will taper prednisone by 1mg/month until she is off. She will discuss with Dr. Sandoval if a short course of prednisone could be used for flares in SOB.  She has no history of fracture, does not drink or smoke, and has minimal family history of osteoporosis.     Long term monitoring of immune suppression-plan laboratory testing today.    Continued bone health (Vit D, Ca) with repeat DEXA in 2 years (11/2019)    Labs in 3 months.  RTC in 6 months.    Rheumatology Attending:    This patient was interviewed and examined in the presence of the medical fellow, and this note reflects our mutual impression.     Master Mendieta MD  Professor of Medicine  Director, Division of Rheumatic and Autoimmune Diseases

## 2018-02-19 DIAGNOSIS — J45.909 ASTHMA: ICD-10-CM

## 2018-04-02 DIAGNOSIS — D72.18 CHURG-STRAUSS SYNDROME (H): ICD-10-CM

## 2018-04-02 DIAGNOSIS — M30.1 CHURG-STRAUSS SYNDROME (H): ICD-10-CM

## 2018-04-02 RX ORDER — BUDESONIDE 0.5 MG/2ML
0.5 INHALANT ORAL 2 TIMES DAILY
Qty: 180 ML | Refills: 0 | Status: SHIPPED | OUTPATIENT
Start: 2018-04-02 | End: 2018-06-26

## 2018-04-11 ENCOUNTER — OFFICE VISIT (OUTPATIENT)
Dept: OTOLARYNGOLOGY | Facility: CLINIC | Age: 58
End: 2018-04-11
Payer: COMMERCIAL

## 2018-04-11 VITALS — HEIGHT: 64 IN | WEIGHT: 133 LBS | BODY MASS INDEX: 22.71 KG/M2

## 2018-04-11 DIAGNOSIS — D72.18 CHURG-STRAUSS SYNDROME (H): Primary | ICD-10-CM

## 2018-04-11 DIAGNOSIS — H65.33 CHRONIC MUCOID OTITIS MEDIA OF BOTH EARS: ICD-10-CM

## 2018-04-11 DIAGNOSIS — M30.1 CHURG-STRAUSS SYNDROME (H): Primary | ICD-10-CM

## 2018-04-11 DIAGNOSIS — J32.4 CHRONIC PANSINUSITIS: ICD-10-CM

## 2018-04-11 ASSESSMENT — PAIN SCALES - GENERAL: PAINLEVEL: NO PAIN (0)

## 2018-04-11 NOTE — NURSING NOTE
Chief Complaint   Patient presents with     RECHECK     f/u nasal polyp     Magali Monterroso Medical Assistant

## 2018-04-11 NOTE — PATIENT INSTRUCTIONS
Plan of care:  Follow up with Dr Mitchell as needed  Clinic contact information:  1. To schedule an appointment call [phone], option 1  2. To talk to the Triage RN call [phone], option 3  3. If you need to speak to Gogo or get a message to your doctor on a Friday, call the triage RN  4. GogoRN: [phone]  5. Surgery scheduling:      Karolyn Chacon: [phone]      Mari Vasquez: [phone]  6. Fax: [phone]  7. [phone]

## 2018-04-11 NOTE — LETTER
4/11/2018       RE: Angelina Trinh  [address] [place] [address]     Dear Colleague,    Thank you for referring your patient, Angelina Trinh, to the [facility] EAR NOSE AND THROAT at [facility]. Please see a copy of my visit note below.    Angelina is stable with regards to sinus disease.  She has had a few URIs which have not progressed to sinus infections.  She has had increased difficulty with plugged ears and hearing loss.  Has been on and off of prednisone, and is using budesonide consistently.  Has osteopenia.  Sense of smell is ok, and breathing is not an issue.      Exam:  Bilateral 'glue' ears.  Anterior rhinoscopy with thick mucous, left purulence vs polyp.    Nasal endoscopy done to evaluate for infection vs polyp.  Topical spray applied, rigid scope used.  R is clear, scarring in middle meatus.  L shows large polyps, non obstruction and thick secretions.  No signs of infection.    A/P:  Stable Churg Kip - continues to follow with pulmonary and rheumatology.  I will see her PRN.  Will not recommend tubes at this time, we discussed this thoroughly.    Again, thank you for allowing me to participate in the care of your patient.      Sincerely,    Gay Mitchell MD

## 2018-04-11 NOTE — PROGRESS NOTES
Angelina is stable with regards to sinus disease.  She has had a few URIs which have not progressed to sinus infections.  She has had increased difficulty with plugged ears and hearing loss.  Has been on and off of prednisone, and is using budesonide consistently.  Has osteopenia.  Sense of smell is ok, and breathing is not an issue.      Exam:  Bilateral 'glue' ears.  Anterior rhinoscopy with thick mucous, left purulence vs polyp.    Nasal endoscopy done to evaluate for infection vs polyp.  Topical spray applied, rigid scope used.  R is clear, scarring in middle meatus.  L shows large polyps, non obstruction and thick secretions.  No signs of infection.    A/P:  Stable Churg Kip - continues to follow with pulmonary and rheumatology.  I will see her PRN.  Will not recommend tubes at this time, we discussed this thoroughly.

## 2018-04-11 NOTE — MR AVS SNAPSHOT
After Visit Summary   2018    Angelina Trinh    MRN: [id number]           Patient Information     Date Of Birth          1960        Visit Information        Provider Department      2018 7:00 AM Gay Mitchell MD [facility] Ear Nose and Throat        Today's Diagnoses     Churg-Kip syndrome (H)    -  1    Chronic pansinusitis        Chronic mucoid otitis media of both ears          Care Instructions    Plan of care:  Follow up with Dr Mitchell as needed  Clinic contact information:  1. To schedule an appointment call [phone], option 1  2. To talk to the Triage RN call [phone], option 3  3. If you need to speak to Gogo or get a message to your doctor on a Friday, call the triage RN  4. GEMA Bowens: [phone]  5. Surgery scheduling:      Karolyn Chacon: [phone]      Mari Vasquez: [phone]  6. Fax: [phone]  7. [phone]            Follow-ups after your visit        Your next 10 appointments already scheduled     May 15, 2018  7:30 AM CDT   (Arrive by 7:15 AM)   Return Visit with MD SALENA Hartman [facility] Rheumatology ([facility])    [address] [place] [address]  [address] [place] [address] [phone]              Who to contact     Please call your clinic at [phone] to:    Ask questions about your health    Make or cancel appointments    Discuss your medicines    Learn about your test results    Speak to your doctor            Additional Information About Your Visit        [employer]MineOnline Information     [employer] gives you secure access to your electronic health record. If you see a primary care provider, you can also send messages to your care team and make appointments. If you have questions, please call your primary care clinic.  If you do not have a primary care provider, please call [phone] and they will assist you.      [employer] is an electronic gateway that provides easy, online access to your medical records.  "With MyChart, you can request a clinic appointment, read your test results, renew a prescription or communicate with your care team.     To access your existing account, please contact your [facility] or call [phone] for assistance.        Care EveryWhere ID     This is your Care EveryWhere ID. This could be used by other organizations to access your [place] medical records  [id number]        Your Vitals Were     Height BMI (Body Mass Index)                1.619 m (5' 3.74\") 23.02 kg/m2           Blood Pressure from Last 3 Encounters:   11/14/17 121/78   10/06/17 125/78   05/23/17 113/73    Weight from Last 3 Encounters:   04/11/18 60.3 kg (133 lb)   11/14/17 59.9 kg (132 lb)   10/06/17 60.8 kg (134 lb)              We Performed the Following     NASAL ENDOSCOPY, DIAGNOSTIC        Primary Care Provider Office Phone # Fax #    John BAUM Bing [phone]       [address]    [facility] [address]        Equal Access to Services     [facility]: Hadii aad ku hadasho Sojanice, waaxda luqadaha, qaybta kaalmada adesaminayaamanda, neal oseguera . So [facility] [phone].    ATENCIÓN: Si habla español, tiene a adams disposición servicios gratuitos de asistencia lingüística. Llame al [phone].    We comply with applicable federal civil rights laws and [place] laws. We do not discriminate on the basis of race, color, national origin, age, disability, sex, sexual orientation, or gender identity.            Thank you!     Thank you for choosing [facility] EAR NOSE AND THROAT  for your care. Our goal is always to provide you with excellent care. Hearing back from our patients is one way we can continue to improve our services. Please take a few minutes to complete the written survey that you may receive in the mail after your visit with us. Thank you!             Your Updated Medication List - Protect others around you: Learn how to " safely use, store and throw away your medicines at www.disposemymeds.org.          This list is accurate as of 4/11/18  7:27 AM.  Always use your most recent med list.                   Brand Name Dispense Instructions for use Diagnosis    ADVAIR DISKUS 250-50 MCG/DOSE diskus inhaler   Generic drug:  fluticasone-salmeterol     3 Inhaler    INHALE 1 PUFF INTO THE LUNGS 2 TIMES DAILY    Asthma       albuterol 108 (90 Base) MCG/ACT Inhaler    PROAIR HFA    1 Inhaler    Inhale 1-2 puffs into the lungs every 6 hours as needed for shortness of breath / dyspnea Every 4-6 hours as needed and directed    ILD (interstitial lung disease) (H)       ALLEGRA 180 MG tablet   Generic drug:  fexofenadine      Take 1 tablet by mouth daily as needed.        azaTHIOprine 50 MG tablet    IMURAN    90 tablet    Take 1 tablet (50 mg) by mouth daily TAKE BY MOUTH WITH FOOD.    Churg-Kip syndrome (H), Long-term use of immunosuppressant medication, Mild persistent asthma, unspecified whether complicated       budesonide 0.5 MG/2ML neb solution    PULMICORT    180 mL    Take 2 mLs (0.5 mg) by nebulization 2 times daily Mix with NS and use as directed with Sinugator    Churg-Kip syndrome (H)       ipratropium 0.06 % spray    ATROVENT    1 Box    Spray 2 sprays into both nostrils 4 times daily as needed for rhinitis    Post-nasal drip       ketotifen 0.025 % Soln ophthalmic solution    ZADITOR/REFRESH ANTI-ITCH     1 drop 2 times daily        omeprazole 20 MG CR capsule    priLOSEC    30 capsule    Take 1 capsule (20 mg) by mouth daily    Asthma exacerbation, Churg-Kip syndrome (H), Long-term use of immunosuppressant medication, Mild persistent asthma, uncomplicated, Heartburn       predniSONE 1 MG tablet    DELTASONE    120 tablet    Take 4 tablets by mouth daily for 1 month, then 3 daily for 1 month, then 2 daily for 1 month, then 1 daily for 1 month, then stop    Churg-Kip syndrome (H), Mild persistent asthma, unspecified  whether complicated, Long-term use of immunosuppressant medication       vitamin D 2000 units tablet      Take 1 tablet by mouth daily.        ZANTAC 75 PO      Take 1 tablet by mouth daily    Asthma exacerbation, Churg-Kip syndrome (H), Long-term use of immunosuppressant medication, Mild persistent asthma, uncomplicated

## 2018-04-27 ENCOUNTER — TELEPHONE (OUTPATIENT)
Dept: OTOLARYNGOLOGY | Facility: CLINIC | Age: 58
End: 2018-04-27

## 2018-04-27 DIAGNOSIS — M30.1 CHURG-STRAUSS SYNDROME (H): Primary | ICD-10-CM

## 2018-04-27 DIAGNOSIS — D72.18 CHURG-STRAUSS SYNDROME (H): Primary | ICD-10-CM

## 2018-04-27 RX ORDER — PREDNISONE 20 MG/1
40 TABLET ORAL DAILY
Qty: 10 TABLET | Refills: 0 | Status: SHIPPED | OUTPATIENT
Start: 2018-04-27 | End: 2018-05-02

## 2018-04-27 NOTE — TELEPHONE ENCOUNTER
Patient calls as now that she is off maintenance Pred 5 mg daily, she is having trouble clearing end of latest sinus/ ear plugging, and requests 5 day Prednisone burst. This was ok'd by Dr Mitchell and sent to pharmacy.

## 2018-05-15 ENCOUNTER — OFFICE VISIT (OUTPATIENT)
Dept: RHEUMATOLOGY | Facility: CLINIC | Age: 58
End: 2018-05-15
Attending: INTERNAL MEDICINE
Payer: COMMERCIAL

## 2018-05-15 VITALS
HEIGHT: 64 IN | OXYGEN SATURATION: 97 % | WEIGHT: 134.3 LBS | BODY MASS INDEX: 22.93 KG/M2 | DIASTOLIC BLOOD PRESSURE: 81 MMHG | SYSTOLIC BLOOD PRESSURE: 121 MMHG | TEMPERATURE: 97.8 F | HEART RATE: 77 BPM

## 2018-05-15 DIAGNOSIS — J45.30 MILD PERSISTENT ASTHMA, UNSPECIFIED WHETHER COMPLICATED: ICD-10-CM

## 2018-05-15 DIAGNOSIS — Z79.60 LONG-TERM USE OF IMMUNOSUPPRESSANT MEDICATION: ICD-10-CM

## 2018-05-15 DIAGNOSIS — M85.80 OSTEOPENIA, UNSPECIFIED LOCATION: ICD-10-CM

## 2018-05-15 DIAGNOSIS — M30.1 CHURG-STRAUSS SYNDROME (H): ICD-10-CM

## 2018-05-15 DIAGNOSIS — D72.18 CHURG-STRAUSS SYNDROME (H): ICD-10-CM

## 2018-05-15 DIAGNOSIS — M30.1 CHURG-STRAUSS SYNDROME (H): Primary | ICD-10-CM

## 2018-05-15 DIAGNOSIS — D72.18 CHURG-STRAUSS SYNDROME (H): Primary | ICD-10-CM

## 2018-05-15 LAB
ALBUMIN SERPL-MCNC: 3.9 G/DL (ref 3.4–5)
ALBUMIN UR-MCNC: NEGATIVE MG/DL
ALT SERPL W P-5'-P-CCNC: 27 U/L (ref 0–50)
APPEARANCE UR: CLEAR
AST SERPL W P-5'-P-CCNC: 24 U/L (ref 0–45)
BASOPHILS # BLD AUTO: 0.1 10E9/L (ref 0–0.2)
BASOPHILS NFR BLD AUTO: 1.7 %
BILIRUB UR QL STRIP: NEGATIVE
COLOR UR AUTO: ABNORMAL
CREAT SERPL-MCNC: 0.98 MG/DL (ref 0.52–1.04)
DIFFERENTIAL METHOD BLD: ABNORMAL
EOSINOPHIL # BLD AUTO: 1.2 10E9/L (ref 0–0.7)
EOSINOPHIL NFR BLD AUTO: 20.4 %
ERYTHROCYTE [DISTWIDTH] IN BLOOD BY AUTOMATED COUNT: 13 % (ref 10–15)
GFR SERPL CREATININE-BSD FRML MDRD: 59 ML/MIN/1.7M2
GLUCOSE UR STRIP-MCNC: NEGATIVE MG/DL
HCT VFR BLD AUTO: 44.1 % (ref 35–47)
HGB BLD-MCNC: 15.2 G/DL (ref 11.7–15.7)
HGB UR QL STRIP: NEGATIVE
IMM GRANULOCYTES # BLD: 0 10E9/L (ref 0–0.4)
IMM GRANULOCYTES NFR BLD: 0.2 %
KETONES UR STRIP-MCNC: NEGATIVE MG/DL
LEUKOCYTE ESTERASE UR QL STRIP: NEGATIVE
LYMPHOCYTES # BLD AUTO: 1.1 10E9/L (ref 0.8–5.3)
LYMPHOCYTES NFR BLD AUTO: 17.9 %
MCH RBC QN AUTO: 31.1 PG (ref 26.5–33)
MCHC RBC AUTO-ENTMCNC: 34.5 G/DL (ref 31.5–36.5)
MCV RBC AUTO: 90 FL (ref 78–100)
MONOCYTES # BLD AUTO: 0.5 10E9/L (ref 0–1.3)
MONOCYTES NFR BLD AUTO: 7.8 %
MUCOUS THREADS #/AREA URNS LPF: PRESENT /LPF
NEUTROPHILS # BLD AUTO: 3.1 10E9/L (ref 1.6–8.3)
NEUTROPHILS NFR BLD AUTO: 52 %
NITRATE UR QL: NEGATIVE
NRBC # BLD AUTO: 0 10*3/UL
NRBC BLD AUTO-RTO: 0 /100
PH UR STRIP: 5 PH (ref 5–7)
PLATELET # BLD AUTO: 206 10E9/L (ref 150–450)
RBC # BLD AUTO: 4.88 10E12/L (ref 3.8–5.2)
RBC #/AREA URNS AUTO: <1 /HPF (ref 0–2)
SOURCE: ABNORMAL
SP GR UR STRIP: 1 (ref 1–1.03)
UROBILINOGEN UR STRIP-MCNC: 0 MG/DL (ref 0–2)
WBC # BLD AUTO: 6 10E9/L (ref 4–11)
WBC #/AREA URNS AUTO: 0 /HPF (ref 0–5)

## 2018-05-15 PROCEDURE — G0463 HOSPITAL OUTPT CLINIC VISIT: HCPCS | Mod: ZF

## 2018-05-15 PROCEDURE — 36415 COLL VENOUS BLD VENIPUNCTURE: CPT | Performed by: INTERNAL MEDICINE

## 2018-05-15 PROCEDURE — 82306 VITAMIN D 25 HYDROXY: CPT | Performed by: INTERNAL MEDICINE

## 2018-05-15 PROCEDURE — 81001 URINALYSIS AUTO W/SCOPE: CPT | Performed by: INTERNAL MEDICINE

## 2018-05-15 PROCEDURE — 84460 ALANINE AMINO (ALT) (SGPT): CPT | Performed by: INTERNAL MEDICINE

## 2018-05-15 PROCEDURE — 82040 ASSAY OF SERUM ALBUMIN: CPT | Performed by: INTERNAL MEDICINE

## 2018-05-15 PROCEDURE — 84450 TRANSFERASE (AST) (SGOT): CPT | Performed by: INTERNAL MEDICINE

## 2018-05-15 PROCEDURE — 82565 ASSAY OF CREATININE: CPT | Performed by: INTERNAL MEDICINE

## 2018-05-15 PROCEDURE — 85025 COMPLETE CBC W/AUTO DIFF WBC: CPT | Performed by: INTERNAL MEDICINE

## 2018-05-15 RX ORDER — AZATHIOPRINE 50 MG/1
50 TABLET ORAL DAILY
Qty: 90 TABLET | Refills: 1 | Status: SHIPPED | OUTPATIENT
Start: 2018-05-15 | End: 2018-11-13

## 2018-05-15 ASSESSMENT — PAIN SCALES - GENERAL: PAINLEVEL: NO PAIN (0)

## 2018-05-15 NOTE — NURSING NOTE
Chief Complaint   Patient presents with     RECHECK     Churg-Kip Vasculitis    Pt roomed, vitals, meds, and allergies reviewed with pt. Pt ready for provider.  Jaime Cobos, CMA

## 2018-05-15 NOTE — LETTER
5/15/2018    RE: Angelina Trinh  [address] [place] [id number]     [facility]  Rheumatology Clinic  Master Mendieta MD  05/15/2018        Assessment and Plan:  Churg-Kip syndrome (H)  This continues to be very stable today without evidence of disease recurrence. We previously attempt to taper previously off of azathioprine, and this was unsuccessful. Therefore, we will maintain the low dose azathioprine to prevent disease relapse. No signs of adverse event with the azathioprine and we will continue with this. Hope to see that eosinophils remain suppressed off of prednisone.  - azaTHIOprine (IMURAN) 50 MG tablet  Dispense: 90 tablet; Refill: 1    Osteopenia, unspecified location  Will continue with calcium and vitamin D supplementation for bone health. Repeat DEXA scan in November of 2019.   1- 25 Hydroxyvitamin D2 and D3    Long-term use of immunosuppressant medication  Will proceed with routine laboratory testing today. Routine toxicity screening. Patient will consider Shingrix immunization. She defers pneumococcal 23-V immunization today.    Mild persistent asthma, unspecified whether complicated  This is quiescent off of prednisone.    Follow-up: Return in about 6 months (around 11/15/2018).     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 This has been associated with asthma, eosinophilia (26%), chronic sinusitis and allergic rhinitis. P-ANCA positive. Treatment with Solumedrol 1mg IV x 2 and Cyclophosphamide 1.25 mg IV infusion beginning on 12/3/2004, and discontinued after three doses. She continues the azathioprine 50 mg daily for increases in eosinophilia and to prevent relapse.     Primary care provider: Dr. John Smart.     Interval History:  Angelina states she is doing well today. She did see Dr. Mitchell recently for some congestion and ear  fullness that was treated with a burst of prednisone. She continues to have some fullness in her right ear, but overall symptoms are improving.     She is breathing well and is happy with Advair; she does have a chronic postnasal drip. She has tapered down from prednisone since our last visit and no longer takes this on a regular basis.     At the time of our last visit on 11/14/2017 we discussed she will undergo a repeat DEXA scan in two years. She continues to use calcium and vitamin D supplementation. She additionally mentioned she was undergoing skin biopsies at that time which she states today were negative for any neoplasia.     She is due for three month laboratory testing today. She plans to have the new shingles vaccination through her employer.     Energy level is great. She denies any stiffness in the morning or any joint pain. She denies any hearing or vision difficulties, sores in her mouth or nose, or any pain in her legs. She does struggle intermittently with reflux for which she uses Tums to treat. She has not had any other GI issues, including diarrhea or abdominal pain. She does mention coming down with a fever a few months ago, though this resolved after a few days with minimal associated symptoms.     Immunizations:   Most Recent Immunizations   Administered Date(s) Administered     Influenza (IIV3) PF 11/09/2012     Influenza Vaccine IM 3yrs+ 4 Valent IIV4 10/06/2017     Pneumo Conj 13-V (2010&after) 11/22/2016     Review of Systems:   Pertinent items are noted in HPI, remainder of complete ROS is negative.      Active Medications:      ADVAIR DISKUS 250-50 MCG/DOSE diskus inhaler, INHALE 1 PUFF INTO THE LUNGS 2 TIMES DAILY, Disp: 3 Inhaler, Rfl: 3     albuterol (PROAIR HFA) 108 (90 BASE) MCG/ACT Inhaler, Inhale 1-2 puffs into the lungs every 6 hours as needed for shortness of breath / dyspnea Every 4-6 hours as needed and directed, Disp: 1 Inhaler, Rfl: 11     azaTHIOprine (IMURAN) 50 MG  tablet, Take 1 tablet (50 mg) by mouth daily TAKE BY MOUTH WITH FOOD., Disp: 90 tablet, Rfl: 1     budesonide (PULMICORT) 0.5 MG/2ML neb solution, Take 2 mLs (0.5 mg) by nebulization 2 times daily Mix with NS and use as directed with Sinugator, Disp: 180 mL, Rfl: 0     Cholecalciferol (VITAMIN D) 2000 UNIT tablet, Take 1 tablet by mouth daily., Disp: , Rfl:      fexofenadine (ALLEGRA) 180 MG tablet, Take 1 tablet by mouth daily as needed., Disp: , Rfl:      ketotifen (ZADITOR/REFRESH ANTI-ITCH) 0.025 % SOLN ophthalmic solution, 1 drop 2 times daily, Disp: , Rfl:      RaNITidine HCl (ZANTAC 75 PO), Take 1 tablet by mouth daily, Disp: , Rfl:      predniSONE (DELTASONE) 1 MG tablet, Take 4 tablets by mouth daily for 1 month, then 3 daily for 1 month, then 2 daily for 1 month, then 1 daily for 1 month, then stop (Patient not taking: Reported on 2018), Disp: 120 tablet, Rfl: 2      Allergies:   Penicillins; Sulfa drugs; Mold; Pollen extract; and Amoxicillin-pot clavulanate.       Past Medical History:  Allergic rhinitis.   Asthma, requiring high dose prednisone at times.   Chronic cough.   Chronic sinusitis.   Scoliosis.   Ocular migraine.   Tympanic membrane rupture.   Posterior vitreous detachment, right eye.   Churg-Kip syndrome with lung involvement.   Gastroesophageal reflux disease.   Hearing problem.   Hoarseness.   Nasal polyps.   Tinnitus.   Long-term use of immunosuppressant medication.    Injuries: Clavicle fracture and rib fracture.       Past Surgical History:  Adenoidectomy.   ENT surgery.   Nasal/sinus polypectomy.   Tonsillectomy.   Cervical colposcopy.     Family History:   Father: Positive for diabetes, osteoarthritis, adult onset hydrocephalus, dementia, and cerebrovascular disease.   Mother: Positive for brain tumor, .   Brother: Positive for brain tumor, hypertension, hyperlipidemia, decreased.   Aunt: Positive for bone cancer.   Sister: Positive for Grave's disease, hypertension,  "and hyperlipidemia.    Sister: Positive for premature irritable bowel, osteoarthritis, hypertension, and PVC's.   Paternal Aunt: Positive for PMR.   Great grandmother: Positive for rheumatoid arthritis.       Social History:   She is a former smoker; quit in . Denies alcohol use. 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   no children. Right hand dominant. She mentions her brother-in-law  in a snowmobile accident in January. She is expecting a great niece in the upcoming weeks.      Physical Exam:   /81  Pulse 77  Temp 97.8  F (36.6  C) (Oral)  Ht 1.613 m (5' 3.5\")  Wt 60.9 kg (134 lb 4.8 oz)  SpO2 97%  BMI 23.42 kg/m2   Constitutional: WD-WN-WG cooperative, NAD  Eyes: PERRLA,  EOMI, anichteric sclera, nl conjunctiva  ENT: mouth clear, normal lips, teeth, gums, and sinus  Neck: no mass or thyroid enlargement, no JVD  Resp: lungs clear to auscultation, normal effort, nl to palpation  CV: RRR, no murmurs, rubs or gallops, no edema  GI: no ABD mass or tenderness, soft  Lymph: no cervical or epitrochlear nodes  MS: All TMJ, neck, shoulder, elbow, wrist, hand, spine, hip, knee, ankle, and foot joints were examined and otherwise found normal. Normal  strength. No active synovitis or deformity. Full strength and ROM.  Skin: no rashes or lesions, nl nails  Neuro: nl sensation and strength  Psych: nl judgement, orientation, memory, affect.     Scribe Disclosure:   Екатерина HURST, am serving as a scribe to document services personally performed by Master Mendieta MD at this visit, based upon the provider's statements to me. All documentation has been reviewed by the aforementioned provider prior to being entered into the official medical record.     Portions of this medical record were completed by a scribe. UPON MY REVIEW AND AUTHENTICATION BY ELECTRONIC SIGNATURE, this confirms (a) I performed the applicable " clinical services, and (b) the record is accurate.      Master Mendieta MD

## 2018-05-15 NOTE — MR AVS SNAPSHOT
After Visit Summary   5/15/2018    Angelina Trinh    MRN: [id number]           Patient Information     Date Of Birth          1960        Visit Information        Provider Department      5/15/2018 7:30 AM Master Mendieta MD [facility] Rheumatology        Today's Diagnoses     Churg-Kip syndrome (H)    -  1    Osteopenia, unspecified location        Long-term use of immunosuppressant medication        Mild persistent asthma, unspecified whether complicated           Follow-ups after your visit        Follow-up notes from your care team     Return in about 6 months (around 11/15/2018).      Your next 10 appointments already scheduled     Nov 13, 2018  7:30 AM CST   (Arrive by 7:15 AM)   Return Visit with Master Mendieta MD   [facility] Rheumatology ([facility])    [address] [place] [address]  [address]  [facility] [address] [phone]              Who to contact     If you have questions or need follow up information about today's clinic visit or your schedule please contact [facility] RHEUMATOLOGY directly at [phone].  Normal or non-critical lab and imaging results will be communicated to you by Navarikhart, letter or phone within 4 business days after the clinic has received the results. If you do not hear from us within 7 days, please contact the clinic through Beroomerst or phone. If you have a critical or abnormal lab result, we will notify you by phone as soon as possible.  Submit refill requests through Partender or call your pharmacy and they will forward the refill request to us. Please allow 3 business days for your refill to be completed.          Additional Information About Your Visit        Navarikhart Information     Partender gives you secure access to your electronic health record. If you see a primary care provider, you can also send messages to your care team and make appointments. If you have questions, please call your primary care clinic.  If you do not  "have a primary care provider, please call [phone] and they will assist you.        Care EveryWhere ID     This is your Care EveryWhere ID. This could be used by other organizations to access your [place] medical records  [id number]        Your Vitals Were     Pulse Temperature Height Pulse Oximetry BMI (Body Mass Index)       77 97.8  F (36.6  C) (Oral) 1.613 m (5' 3.5\") 97% 23.42 kg/m2        Blood Pressure from Last 3 Encounters:   05/15/18 121/81   11/14/17 121/78   10/06/17 125/78    Weight from Last 3 Encounters:   05/15/18 60.9 kg (134 lb 4.8 oz)   04/11/18 60.3 kg (133 lb)   11/14/17 59.9 kg (132 lb)              We Performed the Following     25 Hydroxyvitamin D2 and D3          Today's Medication Changes          These changes are accurate as of 5/15/18  8:24 AM.  If you have any questions, ask your nurse or doctor.               Stop taking these medicines if you haven't already. Please contact your care team if you have questions.     predniSONE 1 MG tablet   Commonly known as:  DELTASONE   Stopped by:  Master Mendieta MD                Where to get your medicines      These medications were sent to [facility]/pharmacy #[id number] - [address]  [address] [place] [address]     Phone:  [phone]     azaTHIOprine 50 MG tablet                Primary Care Provider Office Phone # Fax #    John BAUM Bing [phone]       [address] [address]        Equal Access to Services     [facility]: Hadmaynor Montemayor, wabobda luqhoney, qaybta neal parker. So [facility] [phone].    ATENCIÓN: Si habla español, tiene a adams disposición servicios gratuitos de asistencia lingüística. Llame al [phone].    We comply with applicable federal civil rights laws and [place] laws. We do not discriminate on the basis of race, color, national origin, age, disability, " sex, sexual orientation, or gender identity.            Thank you!     Thank you for choosing [facility] RHEUMATOLOGY  for your care. Our goal is always to provide you with excellent care. Hearing back from our patients is one way we can continue to improve our services. Please take a few minutes to complete the written survey that you may receive in the mail after your visit with us. Thank you!             Your Updated Medication List - Protect others around you: Learn how to safely use, store and throw away your medicines at www.disposemymeds.org.          This list is accurate as of 5/15/18  8:24 AM.  Always use your most recent med list.                   Brand Name Dispense Instructions for use Diagnosis    ADVAIR DISKUS 250-50 MCG/DOSE diskus inhaler   Generic drug:  fluticasone-salmeterol     3 Inhaler    INHALE 1 PUFF INTO THE LUNGS 2 TIMES DAILY    Asthma       albuterol 108 (90 Base) MCG/ACT Inhaler    PROAIR HFA    1 Inhaler    Inhale 1-2 puffs into the lungs every 6 hours as needed for shortness of breath / dyspnea Every 4-6 hours as needed and directed    ILD (interstitial lung disease) (H)       ALLEGRA 180 MG tablet   Generic drug:  fexofenadine      Take 1 tablet by mouth daily as needed.        azaTHIOprine 50 MG tablet    IMURAN    90 tablet    Take 1 tablet (50 mg) by mouth daily TAKE BY MOUTH WITH FOOD.    Churg-Kip syndrome (H), Long-term use of immunosuppressant medication, Mild persistent asthma, unspecified whether complicated       budesonide 0.5 MG/2ML neb solution    PULMICORT    180 mL    Take 2 mLs (0.5 mg) by nebulization 2 times daily Mix with NS and use as directed with Sinugator    Churg-Kip syndrome (H)       ketotifen 0.025 % Soln ophthalmic solution    ZADITOR/REFRESH ANTI-ITCH     1 drop 2 times daily        vitamin D 2000 units tablet      Take 1 tablet by mouth daily.        ZANTAC 75 PO      Take 1 tablet by mouth daily    Asthma exacerbation, Churg-Kip syndrome (H),  Long-term use of immunosuppressant medication, Mild persistent asthma, uncomplicated

## 2018-05-15 NOTE — PROGRESS NOTES
[facility]  Rheumatology Clinic  Master Mendieta MD  05/15/2018        Assessment and Plan:  Churg-Kip syndrome (H)  This continues to be very stable today without evidence of disease recurrence. We previously attempt to taper previously off of azathioprine, and this was unsuccessful. Therefore, we will maintain the low dose azathioprine to prevent disease relapse. No signs of adverse event with the azathioprine and we will continue with this. Hope to see that eosinophils remain suppressed off of prednisone.  - azaTHIOprine (IMURAN) 50 MG tablet  Dispense: 90 tablet; Refill: 1    Osteopenia, unspecified location  Will continue with calcium and vitamin D supplementation for bone health. Repeat DEXA scan in November of 2019.   1- 25 Hydroxyvitamin D2 and D3    Long-term use of immunosuppressant medication  Will proceed with routine laboratory testing today. Routine toxicity screening. Patient will consider Shingrix immunization. She defers pneumococcal 23-V immunization today.    Mild persistent asthma, unspecified whether complicated  This is quiescent off of prednisone.    Follow-up: Return in about 6 months (around 11/15/2018).     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 This has been associated with asthma, eosinophilia (26%), chronic sinusitis and allergic rhinitis. P-ANCA positive. Treatment with Solumedrol 1mg IV x 2 and Cyclophosphamide 1.25 mg IV infusion beginning on 12/3/2004, and discontinued after three doses. She continues the azathioprine 50 mg daily for increases in eosinophilia and to prevent relapse.     Primary care provider: Dr. John Smart.     Interval History:  Angelina states she is doing well today. She did see Dr. Mitchell recently for some congestion and ear fullness that was treated with a burst of prednisone. She continues to have some fullness  in her right ear, but overall symptoms are improving.     She is breathing well and is happy with Advair; she does have a chronic postnasal drip. She has tapered down from prednisone since our last visit and no longer takes this on a regular basis.     At the time of our last visit on 11/14/2017 we discussed she will undergo a repeat DEXA scan in two years. She continues to use calcium and vitamin D supplementation. She additionally mentioned she was undergoing skin biopsies at that time which she states today were negative for any neoplasia.     She is due for three month laboratory testing today. She plans to have the new shingles vaccination through her employer.     Energy level is great. She denies any stiffness in the morning or any joint pain. She denies any hearing or vision difficulties, sores in her mouth or nose, or any pain in her legs. She does struggle intermittently with reflux for which she uses Tums to treat. She has not had any other GI issues, including diarrhea or abdominal pain. She does mention coming down with a fever a few months ago, though this resolved after a few days with minimal associated symptoms.     Immunizations:   Most Recent Immunizations   Administered Date(s) Administered     Influenza (IIV3) PF 11/09/2012     Influenza Vaccine IM 3yrs+ 4 Valent IIV4 10/06/2017     Pneumo Conj 13-V (2010&after) 11/22/2016     Review of Systems:   Pertinent items are noted in HPI, remainder of complete ROS is negative.      Active Medications:      ADVAIR DISKUS 250-50 MCG/DOSE diskus inhaler, INHALE 1 PUFF INTO THE LUNGS 2 TIMES DAILY, Disp: 3 Inhaler, Rfl: 3     albuterol (PROAIR HFA) 108 (90 BASE) MCG/ACT Inhaler, Inhale 1-2 puffs into the lungs every 6 hours as needed for shortness of breath / dyspnea Every 4-6 hours as needed and directed, Disp: 1 Inhaler, Rfl: 11     azaTHIOprine (IMURAN) 50 MG tablet, Take 1 tablet (50 mg) by mouth daily TAKE BY MOUTH WITH FOOD., Disp: 90 tablet, Rfl:  1     budesonide (PULMICORT) 0.5 MG/2ML neb solution, Take 2 mLs (0.5 mg) by nebulization 2 times daily Mix with NS and use as directed with Sinugator, Disp: 180 mL, Rfl: 0     Cholecalciferol (VITAMIN D) 2000 UNIT tablet, Take 1 tablet by mouth daily., Disp: , Rfl:      fexofenadine (ALLEGRA) 180 MG tablet, Take 1 tablet by mouth daily as needed., Disp: , Rfl:      ketotifen (ZADITOR/REFRESH ANTI-ITCH) 0.025 % SOLN ophthalmic solution, 1 drop 2 times daily, Disp: , Rfl:      RaNITidine HCl (ZANTAC 75 PO), Take 1 tablet by mouth daily, Disp: , Rfl:      predniSONE (DELTASONE) 1 MG tablet, Take 4 tablets by mouth daily for 1 month, then 3 daily for 1 month, then 2 daily for 1 month, then 1 daily for 1 month, then stop (Patient not taking: Reported on 2018), Disp: 120 tablet, Rfl: 2      Allergies:   Penicillins; Sulfa drugs; Mold; Pollen extract; and Amoxicillin-pot clavulanate.       Past Medical History:  Allergic rhinitis.   Asthma, requiring high dose prednisone at times.   Chronic cough.   Chronic sinusitis.   Scoliosis.   Ocular migraine.   Tympanic membrane rupture.   Posterior vitreous detachment, right eye.   Churg-Kip syndrome with lung involvement.   Gastroesophageal reflux disease.   Hearing problem.   Hoarseness.   Nasal polyps.   Tinnitus.   Long-term use of immunosuppressant medication.    Injuries: Clavicle fracture and rib fracture.       Past Surgical History:  Adenoidectomy.   ENT surgery.   Nasal/sinus polypectomy.   Tonsillectomy.   Cervical colposcopy.     Family History:   Father: Positive for diabetes, osteoarthritis, adult onset hydrocephalus, dementia, and cerebrovascular disease.   Mother: Positive for brain tumor, .   Brother: Positive for brain tumor, hypertension, hyperlipidemia, decreased.   Aunt: Positive for bone cancer.   Sister: Positive for Grave's disease, hypertension, and hyperlipidemia.    Sister: Positive for premature irritable bowel, osteoarthritis,  "hypertension, and PVC's.   Paternal Aunt: Positive for PMR.   Great grandmother: Positive for rheumatoid arthritis.       Social History:   She is a former smoker; quit in . Denies alcohol use. 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  She dances.   no children. Right hand dominant. She mentions her brother-in-law  in a snowmobile accident in January. She is expecting a great niece in the upcoming weeks.      Physical Exam:   /81  Pulse 77  Temp 97.8  F (36.6  C) (Oral)  Ht 1.613 m (5' 3.5\")  Wt 60.9 kg (134 lb 4.8 oz)  SpO2 97%  BMI 23.42 kg/m2   Constitutional: WD-WN-WG cooperative, NAD  Eyes: PERRLA,  EOMI, anichteric sclera, nl conjunctiva  ENT: mouth clear, normal lips, teeth, gums, and sinus  Neck: no mass or thyroid enlargement, no JVD  Resp: lungs clear to auscultation, normal effort, nl to palpation  CV: RRR, no murmurs, rubs or gallops, no edema  GI: no ABD mass or tenderness, soft  Lymph: no cervical or epitrochlear nodes  MS: All TMJ, neck, shoulder, elbow, wrist, hand, spine, hip, knee, ankle, and foot joints were examined and otherwise found normal. Normal  strength. No active synovitis or deformity. Full strength and ROM.  Skin: no rashes or lesions, nl nails  Neuro: nl sensation and strength  Psych: nl judgement, orientation, memory, affect.       Scribe Disclosure:   I, Екатерина Williamson, am serving as a scribe to document services personally performed by Master Mendieta MD at this visit, based upon the provider's statements to me. All documentation has been reviewed by the aforementioned provider prior to being entered into the official medical record.     Portions of this medical record were completed by a scribe. UPON MY REVIEW AND AUTHENTICATION BY ELECTRONIC SIGNATURE, this confirms (a) I performed the applicable clinical services, and (b) the record is accurate.    "

## 2018-05-17 LAB
DEPRECATED CALCIDIOL+CALCIFEROL SERPL-MC: <49 UG/L (ref 20–75)
VITAMIN D2 SERPL-MCNC: <5 UG/L
VITAMIN D3 SERPL-MCNC: 44 UG/L

## 2018-06-26 DIAGNOSIS — M30.1 CHURG-STRAUSS SYNDROME (H): ICD-10-CM

## 2018-06-26 DIAGNOSIS — D72.18 CHURG-STRAUSS SYNDROME (H): ICD-10-CM

## 2018-06-28 RX ORDER — BUDESONIDE 0.5 MG/2ML
INHALANT ORAL
Qty: 180 ML | Refills: 3 | Status: SHIPPED | OUTPATIENT
Start: 2018-06-28 | End: 2018-10-03

## 2018-09-27 DIAGNOSIS — M30.1 CHURG-STRAUSS SYNDROME (H): ICD-10-CM

## 2018-09-27 DIAGNOSIS — D72.18 CHURG-STRAUSS SYNDROME (H): ICD-10-CM

## 2018-09-27 RX ORDER — BUDESONIDE 0.5 MG/2ML
INHALANT ORAL
Qty: 360 ML | Refills: 1 | Status: CANCELLED | OUTPATIENT
Start: 2018-09-27

## 2018-09-27 NOTE — TELEPHONE ENCOUNTER
budesonide (PULMICORT) 0.5 MG/2ML neb solution     Last Written Prescription Date:  6/28/18  Last Fill Quantity: 180ml,   # refills: 3  Last Office Visit : 4/11/18  Future Office visit:  none    Routing  Because:   ACT

## 2018-10-02 ENCOUNTER — TELEPHONE (OUTPATIENT)
Dept: OTOLARYNGOLOGY | Facility: CLINIC | Age: 58
End: 2018-10-02

## 2018-10-02 DIAGNOSIS — D72.18 CHURG-STRAUSS SYNDROME (H): ICD-10-CM

## 2018-10-02 DIAGNOSIS — M30.1 CHURG-STRAUSS SYNDROME (H): ICD-10-CM

## 2018-10-02 NOTE — TELEPHONE ENCOUNTER
[facility] Call Center    Phone Message    May a detailed message be left on voicemail: no    Reason for Call: Medication Question or concern regarding medication   Prescription Clarification  Name of Medication: budesonide (PULMICORT) 0.5 MG/2ML neb   Prescribing Provider: Dr. Gay Mitchell    Pharmacy: [facility]   What on the order needs clarification? Requesting 90 day supply          Action Taken: Message routed to:  [facility] ent

## 2018-10-03 RX ORDER — BUDESONIDE 0.5 MG/2ML
INHALANT ORAL
Qty: 180 ML | Refills: 3 | Status: SHIPPED | OUTPATIENT
Start: 2018-10-03

## 2018-11-13 ENCOUNTER — OFFICE VISIT (OUTPATIENT)
Dept: RHEUMATOLOGY | Facility: CLINIC | Age: 58
End: 2018-11-13
Attending: INTERNAL MEDICINE
Payer: COMMERCIAL

## 2018-11-13 ENCOUNTER — TELEPHONE (OUTPATIENT)
Dept: RHEUMATOLOGY | Facility: CLINIC | Age: 58
End: 2018-11-13

## 2018-11-13 ENCOUNTER — APPOINTMENT (OUTPATIENT)
Dept: LAB | Facility: CLINIC | Age: 58
End: 2018-11-13
Payer: COMMERCIAL

## 2018-11-13 VITALS
HEART RATE: 84 BPM | SYSTOLIC BLOOD PRESSURE: 125 MMHG | TEMPERATURE: 97.7 F | DIASTOLIC BLOOD PRESSURE: 75 MMHG | OXYGEN SATURATION: 97 % | BODY MASS INDEX: 23.71 KG/M2 | WEIGHT: 133.8 LBS | HEIGHT: 63 IN

## 2018-11-13 DIAGNOSIS — J45.30 MILD PERSISTENT ASTHMA, UNSPECIFIED WHETHER COMPLICATED: ICD-10-CM

## 2018-11-13 DIAGNOSIS — M30.1 CHURG-STRAUSS SYNDROME (H): ICD-10-CM

## 2018-11-13 DIAGNOSIS — Z79.60 LONG-TERM USE OF IMMUNOSUPPRESSANT MEDICATION: ICD-10-CM

## 2018-11-13 DIAGNOSIS — D72.18 CHURG-STRAUSS SYNDROME (H): ICD-10-CM

## 2018-11-13 LAB
ALBUMIN SERPL-MCNC: 3.8 G/DL (ref 3.4–5)
ALBUMIN UR-MCNC: NEGATIVE MG/DL
ALT SERPL W P-5'-P-CCNC: 27 U/L (ref 0–50)
APPEARANCE UR: CLEAR
AST SERPL W P-5'-P-CCNC: 24 U/L (ref 0–45)
BASOPHILS # BLD AUTO: 0.1 10E9/L (ref 0–0.2)
BASOPHILS NFR BLD AUTO: 1.4 %
BILIRUB UR QL STRIP: NEGATIVE
COLOR UR AUTO: ABNORMAL
CREAT SERPL-MCNC: 1.02 MG/DL (ref 0.52–1.04)
DIFFERENTIAL METHOD BLD: ABNORMAL
EOSINOPHIL # BLD AUTO: 1.9 10E9/L (ref 0–0.7)
EOSINOPHIL NFR BLD AUTO: 22.2 %
ERYTHROCYTE [DISTWIDTH] IN BLOOD BY AUTOMATED COUNT: 13.1 % (ref 10–15)
GFR SERPL CREATININE-BSD FRML MDRD: 56 ML/MIN/1.7M2
GLUCOSE UR STRIP-MCNC: NEGATIVE MG/DL
HCT VFR BLD AUTO: 46.1 % (ref 35–47)
HGB BLD-MCNC: 15.2 G/DL (ref 11.7–15.7)
HGB UR QL STRIP: NEGATIVE
IMM GRANULOCYTES # BLD: 0 10E9/L (ref 0–0.4)
IMM GRANULOCYTES NFR BLD: 0.2 %
KETONES UR STRIP-MCNC: NEGATIVE MG/DL
LEUKOCYTE ESTERASE UR QL STRIP: NEGATIVE
LYMPHOCYTES # BLD AUTO: 1.2 10E9/L (ref 0.8–5.3)
LYMPHOCYTES NFR BLD AUTO: 14.1 %
MCH RBC QN AUTO: 30.6 PG (ref 26.5–33)
MCHC RBC AUTO-ENTMCNC: 33 G/DL (ref 31.5–36.5)
MCV RBC AUTO: 93 FL (ref 78–100)
MONOCYTES # BLD AUTO: 0.5 10E9/L (ref 0–1.3)
MONOCYTES NFR BLD AUTO: 5.8 %
MUCOUS THREADS #/AREA URNS LPF: PRESENT /LPF
NEUTROPHILS # BLD AUTO: 4.8 10E9/L (ref 1.6–8.3)
NEUTROPHILS NFR BLD AUTO: 56.3 %
NITRATE UR QL: NEGATIVE
NRBC # BLD AUTO: 0 10*3/UL
NRBC BLD AUTO-RTO: 0 /100
PH UR STRIP: 6 PH (ref 5–7)
PLATELET # BLD AUTO: 266 10E9/L (ref 150–450)
RBC # BLD AUTO: 4.97 10E12/L (ref 3.8–5.2)
RBC #/AREA URNS AUTO: <1 /HPF (ref 0–2)
SOURCE: ABNORMAL
SP GR UR STRIP: 1 (ref 1–1.03)
UROBILINOGEN UR STRIP-MCNC: 0 MG/DL (ref 0–2)
WBC # BLD AUTO: 8.5 10E9/L (ref 4–11)
WBC #/AREA URNS AUTO: 1 /HPF (ref 0–5)

## 2018-11-13 PROCEDURE — 81001 URINALYSIS AUTO W/SCOPE: CPT | Performed by: INTERNAL MEDICINE

## 2018-11-13 PROCEDURE — 84460 ALANINE AMINO (ALT) (SGPT): CPT | Performed by: INTERNAL MEDICINE

## 2018-11-13 PROCEDURE — 85025 COMPLETE CBC W/AUTO DIFF WBC: CPT | Performed by: INTERNAL MEDICINE

## 2018-11-13 PROCEDURE — G0463 HOSPITAL OUTPT CLINIC VISIT: HCPCS | Mod: ZF

## 2018-11-13 PROCEDURE — 82565 ASSAY OF CREATININE: CPT | Performed by: INTERNAL MEDICINE

## 2018-11-13 PROCEDURE — 84450 TRANSFERASE (AST) (SGOT): CPT | Performed by: INTERNAL MEDICINE

## 2018-11-13 PROCEDURE — 82040 ASSAY OF SERUM ALBUMIN: CPT | Performed by: INTERNAL MEDICINE

## 2018-11-13 PROCEDURE — 36415 COLL VENOUS BLD VENIPUNCTURE: CPT | Performed by: INTERNAL MEDICINE

## 2018-11-13 RX ORDER — AZATHIOPRINE 50 MG/1
50 TABLET ORAL DAILY
Qty: 90 TABLET | Refills: 1 | Status: SHIPPED | OUTPATIENT
Start: 2018-11-13 | End: 2019-05-14

## 2018-11-13 ASSESSMENT — PAIN SCALES - GENERAL: PAINLEVEL: NO PAIN (0)

## 2018-11-13 NOTE — LETTER
2018      RE: Angelina Trinh  [address] [place] [id number]       Ms. 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 This has been associated with asthma, eosinophilia (26%), chronic sinusitis and allergic rhinitis. P-ANCA positive. Treatment with Solumedrol 1 gm IV x 2 and Cyclophosphamide 1.25 gm IV infusion beginning on 12/3/2004, and discontinued after three doses.  She continues the azathioprine 50 mg daily for increases in eosinophilia. Previous bisphosphonate. She stopped HRT at age 53 (was on for 7 years).     She reports that her vasculitis is stable. She is eating better and exercising. No nervous system or skin disease. Breathing is fine today. Energy is good. No insomnia and no important morning stiffness. She does gel during the day.    She has had persistent problems with nasal polyps and nasal congestion. She thinks she is stable with nasal rinses and corticosteroids.    Azathioprine 50 mg daily with no important infections.      PMH   Medical: Asthma since  requiring high dose prednisone at times. Chronic sinusitis and allergic/eosinophillic rhinitis for many years, Scoliosis, nasal polyps, ocular migraine, GERD, tympanic membrane rupture, posterior vitreous detachment right eye, osteopenia (T = -1.4 )  Surgery: Cervical colposcopy; T and A; nasal polypectomy  Injuries: clavical fracture, rib fracture.      Family Hx   No asthma or inflammatory disease.  Father with type II diabetes, adult onset hydrocephalus, TIA/CVA, osteoarthritis  Brother and mother both  with brain tumor.   Aunt with bone cancer.   Sister with Grave's disease.  Sister with premature irritable bowel OA, HTN, and PVCs  Paternal Aunt with PMR, osteoporosis.  Great grandmother with RA.     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   no children. No EtOH or tobacco. Right handed.    PMSF history personally obtained and updated by me today.     ROS:  +occasional heartburn on zantac  +sinus drainage  +Allergy to PCN and SULFA  Remainder of the 14 point ROS obtained and found negative.     Physical Exam   Constitutional: WD-WN-WG cooperative, NAD  Eyes: PERRLA,  EOMI, anichteric sclera, nl conjunctiva  ENT: mouth clear, normal lips, teeth, gums, and sinus  Neck: no mass or thyroid enlargement, no JVD  Resp: lungs clear to auscultation, normal effort, nl to palpation  CV: RRR, no murmurs, rubs or gallops, no edema  GI: no ABD mass or tenderness, soft  Lymph: no cervical or epitrochlear nodes  MS: All TMJ, neck, shoulder, elbow, wrist, hand, spine, hip, knee, ankle, and foot joints were examined and otherwise found normal. Normal  strength. No active synovitis or deformity. Full strength and ROM.  Skin: no rashes or lesions, nl nails  Neuro: nl sensation and strength  Psych: nl judgement, orientation, memory, affect.    Laboratory:    Component      Latest Ref Rng & Units 5/15/2018   WBC      4.0 - 11.0 10e9/L 6.0   RBC Count      3.8 - 5.2 10e12/L 4.88   Hemoglobin      11.7 - 15.7 g/dL 15.2   Hematocrit      35.0 - 47.0 % 44.1   MCV      78 - 100 fl 90   MCH      26.5 - 33.0 pg 31.1   MCHC      31.5 - 36.5 g/dL 34.5   RDW      10.0 - 15.0 % 13.0   Platelet Count      150 - 450 10e9/L 206   Diff Method       Automated Method   % Neutrophils      % 52.0   % Lymphocytes      % 17.9   % Monocytes      % 7.8   % Eosinophils      % 20.4   % Basophils      % 1.7   % Immature Granulocytes      % 0.2   Nucleated RBCs      0 /100 0   Absolute Neutrophil      1.6 - 8.3 10e9/L 3.1   Absolute Lymphocytes      0.8 - 5.3 10e9/L 1.1   Absolute Monocytes      0.0 - 1.3 10e9/L 0.5   Absolute Eosinophils      0.0 - 0.7 10e9/L 1.2 (H)   Absolute Basophils      0.0 - 0.2 10e9/L 0.1   Abs Immature  Granulocytes      0 - 0.4 10e9/L 0.0   Absolute Nucleated RBC       0.0   Color Urine       Straw   Appearance Urine       Clear   Glucose Urine      NEG:Negative mg/dL Negative   Bilirubin Urine      NEG:Negative Negative   Ketones Urine      NEG:Negative mg/dL Negative   Specific Gravity Urine      1.003 - 1.035 1.003   Blood Urine      NEG:Negative Negative   pH Urine      5.0 - 7.0 pH 5.0   Protein Albumin Urine      NEG:Negative mg/dL Negative   Urobilinogen mg/dL      0.0 - 2.0 mg/dL 0.0   Nitrite Urine      NEG:Negative Negative   Leukocyte Esterase Urine      NEG:Negative Negative   Source       Midstream Urine   WBC Urine      0 - 5 /HPF 0   RBC Urine      0 - 2 /HPF <1   Mucous Urine      NEG:Negative /LPF Present (A)   25 OH Vit D2      ug/L <5   25 OH Vit D3      ug/L 44   25 OH Vit D total      20 - 75 ug/L <49   Creatinine      0.52 - 1.04 mg/dL 0.98   GFR Estimate      >60 mL/min/1.7m2 59 (L)   GFR Estimate If Black      >60 mL/min/1.7m2 71   AST      0 - 45 U/L 24   ALT      0 - 50 U/L 27   Albumin      3.4 - 5.0 g/dL 3.9       Impression:    EGPA/Churg-Kip Syndrome-very stable today with no evidence of vasculitic disease recurrence. She continues to do very well with only modest azathioprine use. No important adverse advents. Based on this we will continue the current regimen.    Nasal polyps/sinus congestion/asthma-polyps have now completely eliminated her smell, and frequent sinus congestion poses risk for bronchitis and aggravation of asthma. This makes me wonder whether more aggressive treatment of polyps is required. We have discussed repeat polypectomy, but need to consider a change in medication to prevent recurrence. In light of her asthma and EGPA, I think that consideration should be given to anti-IL-5 mepolizumab. I will send a message to Dr. Perlman and Dr. Mitchell to discuss.    Long term monitoring of immune suppression-plan laboratory testing today. She will consider shingrix  immunization.    Plan RTC in 6 months.        Master Mendieta MD

## 2018-11-13 NOTE — LETTER
2018       RE: Angelina Trinh  [address] [place] [id number]     Dear Colleague,    Thank you for referring your patient, Angelina Trinh, to the [facility] RHEUMATOLOGY at [facility]. Please see a copy of my visit note below.    Ms. 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 This has been associated with asthma, eosinophilia (26%), chronic sinusitis and allergic rhinitis. P-ANCA positive. Treatment with Solumedrol 1 gm IV x 2 and Cyclophosphamide 1.25 gm IV infusion beginning on 12/3/2004, and discontinued after three doses.  She continues the azathioprine 50 mg daily for increases in eosinophilia. Previous bisphosphonate. She stopped HRT at age 53 (was on for 7 years).     She reports that her vasculitis is stable. She is eating better and exercising. No nervous system or skin disease. Breathing is fine today. Energy is good. No insomnia and no important morning stiffness. She does gel during the day.    She has had persistent problems with nasal polyps and nasal congestion. She thinks she is stable with nasal rinses and corticosteroids.    Azathioprine 50 mg daily with no important infections.      PMH   Medical: Asthma since  requiring high dose prednisone at times. Chronic sinusitis and allergic/eosinophillic rhinitis for many years, Scoliosis, nasal polyps, ocular migraine, GERD, tympanic membrane rupture, posterior vitreous detachment right eye, osteopenia (T = -1.4 )  Surgery: Cervical colposcopy; T and A; nasal polypectomy  Injuries: clavical fracture, rib fracture.      Family Hx   No asthma or inflammatory disease.  Father with type II diabetes, adult onset hydrocephalus, TIA/CVA, osteoarthritis  Brother and mother both  with brain tumor.   Aunt with bone cancer.   Sister with Grave's disease.  Sister with  premature irritable bowel OA, HTN, and PVCs  Paternal Aunt with PMR, osteoporosis.  Great grandmother with RA.     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  She dances.   no children. No EtOH or tobacco. Right handed.    PMSF history personally obtained and updated by me today.     ROS:  +occasional heartburn on zantac  +sinus drainage  +Allergy to PCN and SULFA  Remainder of the 14 point ROS obtained and found negative.     Physical Exam   Constitutional: WD-WN-WG cooperative, NAD  Eyes: PERRLA,  EOMI, anichteric sclera, nl conjunctiva  ENT: mouth clear, normal lips, teeth, gums, and sinus  Neck: no mass or thyroid enlargement, no JVD  Resp: lungs clear to auscultation, normal effort, nl to palpation  CV: RRR, no murmurs, rubs or gallops, no edema  GI: no ABD mass or tenderness, soft  Lymph: no cervical or epitrochlear nodes  MS: All TMJ, neck, shoulder, elbow, wrist, hand, spine, hip, knee, ankle, and foot joints were examined and otherwise found normal. Normal  strength. No active synovitis or deformity. Full strength and ROM.  Skin: no rashes or lesions, nl nails  Neuro: nl sensation and strength  Psych: nl judgement, orientation, memory, affect.    Laboratory:    Component      Latest Ref Rng & Units 5/15/2018   WBC      4.0 - 11.0 10e9/L 6.0   RBC Count      3.8 - 5.2 10e12/L 4.88   Hemoglobin      11.7 - 15.7 g/dL 15.2   Hematocrit      35.0 - 47.0 % 44.1   MCV      78 - 100 fl 90   MCH      26.5 - 33.0 pg 31.1   MCHC      31.5 - 36.5 g/dL 34.5   RDW      10.0 - 15.0 % 13.0   Platelet Count      150 - 450 10e9/L 206   Diff Method       Automated Method   % Neutrophils      % 52.0   % Lymphocytes      % 17.9   % Monocytes      % 7.8   % Eosinophils      % 20.4   % Basophils      % 1.7   % Immature Granulocytes      % 0.2   Nucleated RBCs      0 /100 0   Absolute Neutrophil      1.6 - 8.3 10e9/L 3.1   Absolute Lymphocytes       0.8 - 5.3 10e9/L 1.1   Absolute Monocytes      0.0 - 1.3 10e9/L 0.5   Absolute Eosinophils      0.0 - 0.7 10e9/L 1.2 (H)   Absolute Basophils      0.0 - 0.2 10e9/L 0.1   Abs Immature Granulocytes      0 - 0.4 10e9/L 0.0   Absolute Nucleated RBC       0.0   Color Urine       Straw   Appearance Urine       Clear   Glucose Urine      NEG:Negative mg/dL Negative   Bilirubin Urine      NEG:Negative Negative   Ketones Urine      NEG:Negative mg/dL Negative   Specific Gravity Urine      1.003 - 1.035 1.003   Blood Urine      NEG:Negative Negative   pH Urine      5.0 - 7.0 pH 5.0   Protein Albumin Urine      NEG:Negative mg/dL Negative   Urobilinogen mg/dL      0.0 - 2.0 mg/dL 0.0   Nitrite Urine      NEG:Negative Negative   Leukocyte Esterase Urine      NEG:Negative Negative   Source       Midstream Urine   WBC Urine      0 - 5 /HPF 0   RBC Urine      0 - 2 /HPF <1   Mucous Urine      NEG:Negative /LPF Present (A)   25 OH Vit D2      ug/L <5   25 OH Vit D3      ug/L 44   25 OH Vit D total      20 - 75 ug/L <49   Creatinine      0.52 - 1.04 mg/dL 0.98   GFR Estimate      >60 mL/min/1.7m2 59 (L)   GFR Estimate If Black      >60 mL/min/1.7m2 71   AST      0 - 45 U/L 24   ALT      0 - 50 U/L 27   Albumin      3.4 - 5.0 g/dL 3.9       Impression:    EGPA/Churg-Kip Syndrome-very stable today with no evidence of vasculitic disease recurrence. She continues to do very well with only modest azathioprine use. No important adverse advents. Based on this we will continue the current regimen.    Nasal polyps/sinus congestion/asthma-polyps have now completely eliminated her smell, and frequent sinus congestion poses risk for bronchitis and aggravation of asthma. This makes me wonder whether more aggressive treatment of polyps is required. We have discussed repeat polypectomy, but need to consider a change in medication to prevent recurrence. In light of her asthma and EGPA, I think that consideration should be given to anti-IL-5  mepolizumab. I will send a message to Dr. Perlman and Dr. Mitchell to discuss.    Long term monitoring of immune suppression-plan laboratory testing today. She will consider shingrix immunization.    Plan RTC in 6 months.      Again, thank you for allowing me to participate in the care of your patient.      Sincerely,    Master Mendieta MD

## 2018-11-13 NOTE — Clinical Note
Angelina has increased sinus congestion, drainage, and bronchitis symptoms recently. I wonder whether repeat polypectomy is indicated, and whether anti-IL-5 should be consider for control of all her eosinophil-mediated problems?  Dan

## 2018-11-13 NOTE — TELEPHONE ENCOUNTER
Anti-IL-5 mepolizumab research articles mailed to pt per Dr Mendieta request.    STEVE MyersN RN  Rheumatology RN Coordinator  [facility]

## 2018-11-13 NOTE — PROGRESS NOTES
Ms. 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 This has been associated with asthma, eosinophilia (26%), chronic sinusitis and allergic rhinitis. P-ANCA positive. Treatment with Solumedrol 1 gm IV x 2 and Cyclophosphamide 1.25 gm IV infusion beginning on 12/3/2004, and discontinued after three doses.  She continues the azathioprine 50 mg daily for increases in eosinophilia. Previous bisphosphonate. She stopped HRT at age 53 (was on for 7 years).     She reports that her vasculitis is stable. She is eating better and exercising. No nervous system or skin disease. Breathing is fine today. Energy is good. No insomnia and no important morning stiffness. She does gel during the day.    She has had persistent problems with nasal polyps and nasal congestion. She thinks she is stable with nasal rinses and corticosteroids.    Azathioprine 50 mg daily with no important infections.      PMH   Medical: Asthma since  requiring high dose prednisone at times. Chronic sinusitis and allergic/eosinophillic rhinitis for many years, Scoliosis, nasal polyps, ocular migraine, GERD, tympanic membrane rupture, posterior vitreous detachment right eye, osteopenia (T = -1.4 )  Surgery: Cervical colposcopy; T and A; nasal polypectomy  Injuries: clavical fracture, rib fracture.      Family Hx   No asthma or inflammatory disease.  Father with type II diabetes, adult onset hydrocephalus, TIA/CVA, osteoarthritis  Brother and mother both  with brain tumor.   Aunt with bone cancer.   Sister with Grave's disease.  Sister with premature irritable bowel OA, HTN, and PVCs  Paternal Aunt with PMR, osteoporosis.  Great grandmother with RA.     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   no children. No EtOH or tobacco.  Right handed.    PMSF history personally obtained and updated by me today.     ROS:  +occasional heartburn on zantac  +sinus drainage  +Allergy to PCN and SULFA  Remainder of the 14 point ROS obtained and found negative.     Physical Exam   Constitutional: WD-WN-WG cooperative, NAD  Eyes: PERRLA,  EOMI, anichteric sclera, nl conjunctiva  ENT: mouth clear, normal lips, teeth, gums, and sinus  Neck: no mass or thyroid enlargement, no JVD  Resp: lungs clear to auscultation, normal effort, nl to palpation  CV: RRR, no murmurs, rubs or gallops, no edema  GI: no ABD mass or tenderness, soft  Lymph: no cervical or epitrochlear nodes  MS: All TMJ, neck, shoulder, elbow, wrist, hand, spine, hip, knee, ankle, and foot joints were examined and otherwise found normal. Normal  strength. No active synovitis or deformity. Full strength and ROM.  Skin: no rashes or lesions, nl nails  Neuro: nl sensation and strength  Psych: nl judgement, orientation, memory, affect.    Laboratory:    Component      Latest Ref Rng & Units 5/15/2018   WBC      4.0 - 11.0 10e9/L 6.0   RBC Count      3.8 - 5.2 10e12/L 4.88   Hemoglobin      11.7 - 15.7 g/dL 15.2   Hematocrit      35.0 - 47.0 % 44.1   MCV      78 - 100 fl 90   MCH      26.5 - 33.0 pg 31.1   MCHC      31.5 - 36.5 g/dL 34.5   RDW      10.0 - 15.0 % 13.0   Platelet Count      150 - 450 10e9/L 206   Diff Method       Automated Method   % Neutrophils      % 52.0   % Lymphocytes      % 17.9   % Monocytes      % 7.8   % Eosinophils      % 20.4   % Basophils      % 1.7   % Immature Granulocytes      % 0.2   Nucleated RBCs      0 /100 0   Absolute Neutrophil      1.6 - 8.3 10e9/L 3.1   Absolute Lymphocytes      0.8 - 5.3 10e9/L 1.1   Absolute Monocytes      0.0 - 1.3 10e9/L 0.5   Absolute Eosinophils      0.0 - 0.7 10e9/L 1.2 (H)   Absolute Basophils      0.0 - 0.2 10e9/L 0.1   Abs Immature Granulocytes      0 - 0.4 10e9/L 0.0   Absolute Nucleated RBC       0.0   Color Urine       Straw    Appearance Urine       Clear   Glucose Urine      NEG:Negative mg/dL Negative   Bilirubin Urine      NEG:Negative Negative   Ketones Urine      NEG:Negative mg/dL Negative   Specific Gravity Urine      1.003 - 1.035 1.003   Blood Urine      NEG:Negative Negative   pH Urine      5.0 - 7.0 pH 5.0   Protein Albumin Urine      NEG:Negative mg/dL Negative   Urobilinogen mg/dL      0.0 - 2.0 mg/dL 0.0   Nitrite Urine      NEG:Negative Negative   Leukocyte Esterase Urine      NEG:Negative Negative   Source       Midstream Urine   WBC Urine      0 - 5 /HPF 0   RBC Urine      0 - 2 /HPF <1   Mucous Urine      NEG:Negative /LPF Present (A)   25 OH Vit D2      ug/L <5   25 OH Vit D3      ug/L 44   25 OH Vit D total      20 - 75 ug/L <49   Creatinine      0.52 - 1.04 mg/dL 0.98   GFR Estimate      >60 mL/min/1.7m2 59 (L)   GFR Estimate If Black      >60 mL/min/1.7m2 71   AST      0 - 45 U/L 24   ALT      0 - 50 U/L 27   Albumin      3.4 - 5.0 g/dL 3.9       Impression:    EGPA/Churg-Kip Syndrome-very stable today with no evidence of vasculitic disease recurrence. She continues to do very well with only modest azathioprine use. No important adverse advents. Based on this we will continue the current regimen.    Nasal polyps/sinus congestion/asthma-polyps have now completely eliminated her smell, and frequent sinus congestion poses risk for bronchitis and aggravation of asthma. This makes me wonder whether more aggressive treatment of polyps is required. We have discussed repeat polypectomy, but need to consider a change in medication to prevent recurrence. In light of her asthma and EGPA, I think that consideration should be given to anti-IL-5 mepolizumab. I will send a message to Dr. Perlman and Dr. Mitchell to discuss.    Long term monitoring of immune suppression-plan laboratory testing today. She will consider shingrix immunization.    Plan RTC in 6 months.

## 2018-11-13 NOTE — NURSING NOTE
Chief Complaint   Patient presents with     RECHECK      Churg-Kip syndrome    Pt roomed, vitals, meds, and allergies reviewed with pt. Pt ready for provider.  Jaime Cobos, CMA

## 2018-11-13 NOTE — MR AVS SNAPSHOT
After Visit Summary   11/13/2018    Angelina Trinh    MRN: [id number]           Patient Information     Date Of Birth          1960        Visit Information        Provider Department      11/13/2018 7:30 AM Master Mendieta MD [facility]        Today's Diagnoses     Churg-Kip syndrome (H)        Mild persistent asthma, unspecified whether complicated        Long-term use of immunosuppressant medication           Follow-ups after your visit        Follow-up notes from your care team     Return in about 6 months (around 5/13/2019).      Your next 10 appointments already scheduled     May 14, 2019  7:30 AM CDT   (Arrive by 7:15 AM)   Return Visit with Master Mendieta MD   [facility] ([facility])    [address]  [address]  [place] [address] [phone]              Who to contact     If you have questions or need follow up information about today's clinic visit or your schedule please contact [facility] RHEUMATOLOGY directly at [phone].  Normal or non-critical lab and imaging results will be communicated to you by [employer], letter or phone within 4 business days after the clinic has received the results. If you do not hear from us within 7 days, please contact the clinic through [employer] or phone. If you have a critical or abnormal lab result, we will notify you by phone as soon as possible.  Submit refill requests through [employer] or call your pharmacy and they will forward the refill request to us. Please allow 3 business days for your refill to be completed.          Additional Information About Your Visit        [employer] Information     [employer] gives you secure access to your electronic health record. If you see a primary care provider, you can also send messages to your care team and make appointments. If you have questions, please call your primary care clinic.  If you do not have a primary care provider, please call  "[phone] and they will assist you.        Care EveryWhere ID     This is your Care EveryWhere ID. This could be used by other organizations to access your [place] medical records  [id number]        Your Vitals Were     Pulse Temperature Height Pulse Oximetry BMI (Body Mass Index)       84 97.7  F (36.5  C) (Oral) 1.6 m (5' 3\") 97% 23.7 kg/m2        Blood Pressure from Last 3 Encounters:   11/13/18 125/75   05/15/18 121/81   11/14/17 121/78    Weight from Last 3 Encounters:   11/13/18 60.7 kg (133 lb 12.8 oz)   05/15/18 60.9 kg (134 lb 4.8 oz)   04/11/18 60.3 kg (133 lb)              We Performed the Following     Albumin level     ALT     AST     CBC with platelets differential     Creatinine     UA with Microscopic          Where to get your medicines      These medications were sent to [facility]/pharmacy #[id number] - [address] [address]     Phone:  [phone]     azaTHIOprine 50 MG tablet          Primary Care Provider Office Phone # Fax #    John BAUM Bing [phone]       [address] [address]        Equal Access to Services     SANDY HACKETT : Debbie pinoo Soomaali, waaxda luqadaha, qaybta kaalmada adeegyada, neal ocampo. So [facility] [phone].    ATENCIÓN: Si habla español, tiene a adams disposición servicios gratuitos de asistencia lingüística. Llame al [phone].    We comply with applicable federal civil rights laws and [place] laws. We do not discriminate on the basis of race, color, national origin, age, disability, sex, sexual orientation, or gender identity.            Thank you!     Thank you for choosing [facility] RHEUMATOLOGY  for your care. Our goal is always to provide you with excellent care. Hearing back from our patients is one way we can continue to improve our services. Please take a few minutes to complete the written survey that you may " receive in the mail after your visit with us. Thank you!             Your Updated Medication List - Protect others around you: Learn how to safely use, store and throw away your medicines at www.disposemymeds.org.          This list is accurate as of 11/13/18  9:00 AM.  Always use your most recent med list.                   Brand Name Dispense Instructions for use Diagnosis    ADVAIR DISKUS 250-50 MCG/DOSE diskus inhaler   Generic drug:  fluticasone-salmeterol     3 Inhaler    INHALE 1 PUFF INTO THE LUNGS 2 TIMES DAILY    Asthma       albuterol 108 (90 Base) MCG/ACT inhaler    PROAIR HFA    1 Inhaler    Inhale 1-2 puffs into the lungs every 6 hours as needed for shortness of breath / dyspnea Every 4-6 hours as needed and directed    ILD (interstitial lung disease) (H)       ALLEGRA 180 MG tablet   Generic drug:  fexofenadine      Take 1 tablet by mouth daily as needed.        azaTHIOprine 50 MG tablet    IMURAN    90 tablet    Take 1 tablet (50 mg) by mouth daily TAKE BY MOUTH WITH FOOD.    Churg-Kip syndrome (H), Long-term use of immunosuppressant medication, Mild persistent asthma, unspecified whether complicated       budesonide 0.5 MG/2ML neb solution    PULMICORT    180 mL    TAKE 2 MLS (0.5 MG) BY NEBULIZATION 2 TIMES DAILY MIX WITH NS AND USE AS DIRECTED WITH SINUGATOR    Churg-Kip syndrome (H)       ketotifen 0.025 % Soln ophthalmic solution    ZADITOR/REFRESH ANTI-ITCH     1 drop 2 times daily        vitamin D 2000 units tablet      Take 1 tablet by mouth daily.        ZANTAC 75 PO      Take 1 tablet by mouth as needed    Asthma exacerbation, Churg-Kip syndrome (H), Long-term use of immunosuppressant medication, Mild persistent asthma, uncomplicated

## 2019-02-18 DIAGNOSIS — J45.909 ASTHMA: ICD-10-CM

## 2019-03-12 ENCOUNTER — DOCUMENTATION ONLY (OUTPATIENT)
Dept: CARE COORDINATION | Facility: CLINIC | Age: 59
End: 2019-03-12

## 2019-05-14 ENCOUNTER — OFFICE VISIT (OUTPATIENT)
Dept: RHEUMATOLOGY | Facility: CLINIC | Age: 59
End: 2019-05-14
Attending: INTERNAL MEDICINE
Payer: COMMERCIAL

## 2019-05-14 VITALS
SYSTOLIC BLOOD PRESSURE: 116 MMHG | DIASTOLIC BLOOD PRESSURE: 72 MMHG | BODY MASS INDEX: 24.03 KG/M2 | HEART RATE: 66 BPM | HEIGHT: 63 IN | RESPIRATION RATE: 16 BRPM | TEMPERATURE: 97.6 F | WEIGHT: 135.6 LBS | OXYGEN SATURATION: 96 %

## 2019-05-14 DIAGNOSIS — D72.18 CHURG-STRAUSS SYNDROME (H): ICD-10-CM

## 2019-05-14 DIAGNOSIS — M30.1 CHURG-STRAUSS SYNDROME (H): Primary | ICD-10-CM

## 2019-05-14 DIAGNOSIS — J45.30 MILD PERSISTENT ASTHMA, UNSPECIFIED WHETHER COMPLICATED: ICD-10-CM

## 2019-05-14 DIAGNOSIS — Z79.60 LONG-TERM USE OF IMMUNOSUPPRESSANT MEDICATION: ICD-10-CM

## 2019-05-14 DIAGNOSIS — M30.1 CHURG-STRAUSS SYNDROME (H): ICD-10-CM

## 2019-05-14 DIAGNOSIS — D72.18 CHURG-STRAUSS SYNDROME (H): Primary | ICD-10-CM

## 2019-05-14 LAB
ALBUMIN SERPL-MCNC: 4 G/DL (ref 3.4–5)
ALBUMIN UR-MCNC: NEGATIVE MG/DL
ALT SERPL W P-5'-P-CCNC: 22 U/L (ref 0–50)
APPEARANCE UR: CLEAR
AST SERPL W P-5'-P-CCNC: 18 U/L (ref 0–45)
BASOPHILS # BLD AUTO: 0.1 10E9/L (ref 0–0.2)
BASOPHILS NFR BLD AUTO: 1.4 %
BILIRUB UR QL STRIP: NEGATIVE
COLOR UR AUTO: NORMAL
CREAT SERPL-MCNC: 0.98 MG/DL (ref 0.52–1.04)
DIFFERENTIAL METHOD BLD: ABNORMAL
EOSINOPHIL # BLD AUTO: 1.4 10E9/L (ref 0–0.7)
EOSINOPHIL NFR BLD AUTO: 19.3 %
ERYTHROCYTE [DISTWIDTH] IN BLOOD BY AUTOMATED COUNT: 13.6 % (ref 10–15)
GFR SERPL CREATININE-BSD FRML MDRD: 64 ML/MIN/{1.73_M2}
GLUCOSE UR STRIP-MCNC: NEGATIVE MG/DL
HCT VFR BLD AUTO: 46.1 % (ref 35–47)
HGB BLD-MCNC: 15.5 G/DL (ref 11.7–15.7)
HGB UR QL STRIP: NEGATIVE
IMM GRANULOCYTES # BLD: 0 10E9/L (ref 0–0.4)
IMM GRANULOCYTES NFR BLD: 0.1 %
KETONES UR STRIP-MCNC: NEGATIVE MG/DL
LEUKOCYTE ESTERASE UR QL STRIP: NEGATIVE
LYMPHOCYTES # BLD AUTO: 1 10E9/L (ref 0.8–5.3)
LYMPHOCYTES NFR BLD AUTO: 14.5 %
MCH RBC QN AUTO: 31.1 PG (ref 26.5–33)
MCHC RBC AUTO-ENTMCNC: 33.6 G/DL (ref 31.5–36.5)
MCV RBC AUTO: 92 FL (ref 78–100)
MONOCYTES # BLD AUTO: 0.4 10E9/L (ref 0–1.3)
MONOCYTES NFR BLD AUTO: 5.3 %
NEUTROPHILS # BLD AUTO: 4.1 10E9/L (ref 1.6–8.3)
NEUTROPHILS NFR BLD AUTO: 59.4 %
NITRATE UR QL: NEGATIVE
NRBC # BLD AUTO: 0 10*3/UL
NRBC BLD AUTO-RTO: 0 /100
PH UR STRIP: 6 PH (ref 5–7)
PLATELET # BLD AUTO: 231 10E9/L (ref 150–450)
RBC # BLD AUTO: 4.99 10E12/L (ref 3.8–5.2)
RBC #/AREA URNS AUTO: 0 /HPF (ref 0–2)
SOURCE: NORMAL
SP GR UR STRIP: 1 (ref 1–1.03)
UROBILINOGEN UR STRIP-MCNC: 0 MG/DL (ref 0–2)
WBC # BLD AUTO: 7 10E9/L (ref 4–11)
WBC #/AREA URNS AUTO: 0 /HPF (ref 0–5)

## 2019-05-14 PROCEDURE — G0463 HOSPITAL OUTPT CLINIC VISIT: HCPCS | Mod: ZF

## 2019-05-14 PROCEDURE — 81001 URINALYSIS AUTO W/SCOPE: CPT | Performed by: INTERNAL MEDICINE

## 2019-05-14 PROCEDURE — 36415 COLL VENOUS BLD VENIPUNCTURE: CPT | Performed by: INTERNAL MEDICINE

## 2019-05-14 PROCEDURE — 82040 ASSAY OF SERUM ALBUMIN: CPT | Performed by: INTERNAL MEDICINE

## 2019-05-14 PROCEDURE — 84450 TRANSFERASE (AST) (SGOT): CPT | Performed by: INTERNAL MEDICINE

## 2019-05-14 PROCEDURE — 85025 COMPLETE CBC W/AUTO DIFF WBC: CPT | Performed by: INTERNAL MEDICINE

## 2019-05-14 PROCEDURE — 82565 ASSAY OF CREATININE: CPT | Performed by: INTERNAL MEDICINE

## 2019-05-14 PROCEDURE — 84460 ALANINE AMINO (ALT) (SGPT): CPT | Performed by: INTERNAL MEDICINE

## 2019-05-14 RX ORDER — AZATHIOPRINE 50 MG/1
50 TABLET ORAL DAILY
Qty: 90 TABLET | Refills: 1 | Status: SHIPPED | OUTPATIENT
Start: 2019-05-14 | End: 2019-11-05

## 2019-05-14 ASSESSMENT — MIFFLIN-ST. JEOR: SCORE: 1169.21

## 2019-05-14 ASSESSMENT — PAIN SCALES - GENERAL: PAINLEVEL: NO PAIN (0)

## 2019-05-14 NOTE — PATIENT INSTRUCTIONS
Preventive Care:    Colorectal Cancer Screening: During our visit today, we discussed that it is recommended you receive colorectal cancer screening. Please call or make an appointment with your primary care provider to discuss this. You may also call the [facility] scheduling line ([phone]) to set up a colonoscopy appointment.

## 2019-05-14 NOTE — LETTER
2019    RE: Angelina Trinh  [address] [place] [id number]     Ms. 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 This has been associated with asthma, eosinophilia (26%), chronic sinusitis and allergic rhinitis. P-ANCA positive. Treatment with Solumedrol 1 gm IV x 2 and Cyclophosphamide 1.25 gm IV infusion beginning on 12/3/2004, and discontinued after three doses.  She continues the azathioprine 50 mg daily for increases in eosinophilia. Previous bisphosphonate. She stopped HRT at age 53 (was on for 7 years).     She denies any muscle weakness. No loss of sensation or paresthesia. No fevers, chills or sweats. No ulcers or dysphagia. She has ear plugging, and uses a regimen of budesonide to help this. No ear pain.     She continues to exercise with walking and stretching.     Azathioprine 50 mg daily and she tolerates this well.  She had a sinus infection last October, but no infection since then.    PMH   Medical: Asthma since  requiring high dose prednisone at times. Chronic sinusitis and allergic/eosinophillic rhinitis for many years, Scoliosis, nasal polyps, ocular migraine, GERD, tympanic membrane rupture, posterior vitreous detachment right eye, osteopenia (T = -1.4 )  Surgery: Cervical colposcopy; T and A; nasal polypectomy  Injuries: clavical fracture, rib fracture.      Family Hx   No asthma or inflammatory disease.  Father with type II diabetes, adult onset hydrocephalus, TIA/CVA, osteoarthritis, dementia  Brother and mother both  with brain tumor.   Aunt with bone cancer.   Sister with Grave's disease.  Sister with premature irritable bowel OA, HTN, and PVCs  Paternal Aunt with PMR, osteoporosis.  Great grandmother with RA.     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   no children. No EtOH or tobacco. Right handed.    PMSF history personally obtained and updated by me today.     ROS:  +plugged up ears, but no ear pain  +minor wheezing and coughing; BASSETT in the yard working  +Allergy to PCN and SULFA  Remainder of the 14 point ROS obtained and found negative.     Physical Exam   Constitutional: WD-WN-WG cooperative, NAD  Eyes: PERRLA,  EOMI, anichteric sclera, nl conjunctiva  ENT: mouth clear, normal lips, teeth, gums, sinus, nl TM's  Neck: no mass or thyroid enlargement, no JVD  Resp: lungs clear to auscultation, normal effort, nl to palpation  CV: RRR, no murmurs, rubs or gallops, no edema  GI: no ABD mass or tenderness, soft  Lymph: no cervical or epitrochlear nodes  MS: All TMJ, neck, shoulder, elbow, wrist, hand, spine, hip, knee, ankle, and foot joints were examined and otherwise found normal. Normal  strength. No active synovitis or deformity. Full strength and ROM.  Skin: no rashes or lesions, nl nails  Neuro: nl sensation and strength  Psych: nl judgement, orientation, memory, affect.    Laboratory:    Component      Latest Ref Rng & Units 11/13/2018   WBC      4.0 - 11.0 10e9/L 8.5   RBC Count      3.8 - 5.2 10e12/L 4.97   Hemoglobin      11.7 - 15.7 g/dL 15.2   Hematocrit      35.0 - 47.0 % 46.1   MCV      78 - 100 fl 93   MCH      26.5 - 33.0 pg 30.6   MCHC      31.5 - 36.5 g/dL 33.0   RDW      10.0 - 15.0 % 13.1   Platelet Count      150 - 450 10e9/L 266   Diff Method       Automated Method   % Neutrophils      % 56.3   % Lymphocytes      % 14.1   % Monocytes      % 5.8   % Eosinophils      % 22.2   % Basophils      % 1.4   % Immature Granulocytes      % 0.2   Nucleated RBCs      0 /100 0   Absolute Neutrophil      1.6 - 8.3 10e9/L 4.8   Absolute Lymphocytes      0.8 - 5.3 10e9/L 1.2   Absolute Monocytes      0.0 - 1.3 10e9/L 0.5   Absolute Eosinophils      0.0 - 0.7 10e9/L 1.9 (H)   Absolute Basophils      0.0 - 0.2 10e9/L 0.1   Abs Immature  Granulocytes      0 - 0.4 10e9/L 0.0   Absolute Nucleated RBC       0.0   Color Urine       Straw   Appearance Urine       Clear   Glucose Urine      NEG:Negative mg/dL Negative   Bilirubin Urine      NEG:Negative Negative   Ketones Urine      NEG:Negative mg/dL Negative   Specific Gravity Urine      1.003 - 1.035 1.002 (L)   Blood Urine      NEG:Negative Negative   pH Urine      5.0 - 7.0 pH 6.0   Protein Albumin Urine      NEG:Negative mg/dL Negative   Urobilinogen mg/dL      0.0 - 2.0 mg/dL 0.0   Nitrite Urine      NEG:Negative Negative   Leukocyte Esterase Urine      NEG:Negative Negative   Source       Midstream Urine   WBC Urine      0 - 5 /HPF 1   RBC Urine      0 - 2 /HPF <1   Mucous Urine      NEG:Negative /LPF Present (A)   Creatinine      0.52 - 1.04 mg/dL 1.02   GFR Estimate      >60 mL/min/1.7m2 56 (L)   GFR Estimate If Black      >60 mL/min/1.7m2 68   AST      0 - 45 U/L 24   ALT      0 - 50 U/L 27   Albumin      3.4 - 5.0 g/dL 3.8     Impression:    EGPA/Churg-Kip Syndrome-with only intermittent allergy and sinus problems. No evidence of vasculitis relapse. She continues to tolerate the azathioprine well, and we will continue this maintenance therapy.    Nasal polyps-with recurrent eustachian tube dysfunction. She will follow up in ENT.     Asthma-stable and she will follow up in pulmonary medicine.    Long term monitoring of immune suppression-plan laboratory testing today. She has not yet had her shingrix vaccination.    Plan RTC in 6 months.      Master Mendieta MD

## 2019-05-14 NOTE — NURSING NOTE
"Chief Complaint   Patient presents with     RECHECK     Churg-Kip Vasculitis        Vital signs:  Temp: 97.6  F (36.4  C) Temp src: Oral BP: 116/72 Pulse: 66   Resp: 16 SpO2: 96 %     Height: 160 cm (5' 3\") Weight: 61.5 kg (135 lb 9.6 oz)  Estimated body mass index is 24.02 kg/m  as calculated from the following:    Height as of this encounter: 1.6 m (5' 3\").    Weight as of this encounter: 61.5 kg (135 lb 9.6 oz).          Airam Gardner [facility]  5/14/2019 7:33 AM      "

## 2019-05-14 NOTE — LETTER
2019     RE: Angelina Trinh  [address] [place] [id number]     Dear Colleague,    Thank you for referring your patient, Angelina Trinh, to the [facility] RHEUMATOLOGY at [facility]. Please see a copy of my visit note below.    Ms. 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 This has been associated with asthma, eosinophilia (26%), chronic sinusitis and allergic rhinitis. P-ANCA positive. Treatment with Solumedrol 1 gm IV x 2 and Cyclophosphamide 1.25 gm IV infusion beginning on 12/3/2004, and discontinued after three doses.  She continues the azathioprine 50 mg daily for increases in eosinophilia. Previous bisphosphonate. She stopped HRT at age 53 (was on for 7 years).     She denies any muscle weakness. No loss of sensation or paresthesia. No fevers, chills or sweats. No ulcers or dysphagia. She has ear plugging, and uses a regimen of budesonide to help this. No ear pain.     She continues to exercise with walking and stretching.     Azathioprine 50 mg daily and she tolerates this well.  She had a sinus infection last October, but no infection since then.    PMH   Medical: Asthma since  requiring high dose prednisone at times. Chronic sinusitis and allergic/eosinophillic rhinitis for many years, Scoliosis, nasal polyps, ocular migraine, GERD, tympanic membrane rupture, posterior vitreous detachment right eye, osteopenia (T = -1.4 )  Surgery: Cervical colposcopy; T and A; nasal polypectomy  Injuries: clavical fracture, rib fracture.      Family Hx   No asthma or inflammatory disease.  Father with type II diabetes, adult onset hydrocephalus, TIA/CVA, osteoarthritis, dementia  Brother and mother both  with brain tumor.   Aunt with bone cancer.   Sister with Grave's disease.  Sister with premature irritable bowel OA,  HTN, and PVCs  Paternal Aunt with PMR, osteoporosis.  Great grandmother with RA.     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  She dances.   no children. No EtOH or tobacco. Right handed.    PMSF history personally obtained and updated by me today.     ROS:  +plugged up ears, but no ear pain  +minor wheezing and coughing; BASSETT in the yard working  +Allergy to PCN and SULFA  Remainder of the 14 point ROS obtained and found negative.     Physical Exam   Constitutional: WD-WN-WG cooperative, NAD  Eyes: PERRLA,  EOMI, anichteric sclera, nl conjunctiva  ENT: mouth clear, normal lips, teeth, gums, sinus, nl TM's  Neck: no mass or thyroid enlargement, no JVD  Resp: lungs clear to auscultation, normal effort, nl to palpation  CV: RRR, no murmurs, rubs or gallops, no edema  GI: no ABD mass or tenderness, soft  Lymph: no cervical or epitrochlear nodes  MS: All TMJ, neck, shoulder, elbow, wrist, hand, spine, hip, knee, ankle, and foot joints were examined and otherwise found normal. Normal  strength. No active synovitis or deformity. Full strength and ROM.  Skin: no rashes or lesions, nl nails  Neuro: nl sensation and strength  Psych: nl judgement, orientation, memory, affect.    Laboratory:    Component      Latest Ref Rng & Units 11/13/2018   WBC      4.0 - 11.0 10e9/L 8.5   RBC Count      3.8 - 5.2 10e12/L 4.97   Hemoglobin      11.7 - 15.7 g/dL 15.2   Hematocrit      35.0 - 47.0 % 46.1   MCV      78 - 100 fl 93   MCH      26.5 - 33.0 pg 30.6   MCHC      31.5 - 36.5 g/dL 33.0   RDW      10.0 - 15.0 % 13.1   Platelet Count      150 - 450 10e9/L 266   Diff Method       Automated Method   % Neutrophils      % 56.3   % Lymphocytes      % 14.1   % Monocytes      % 5.8   % Eosinophils      % 22.2   % Basophils      % 1.4   % Immature Granulocytes      % 0.2   Nucleated RBCs      0 /100 0   Absolute Neutrophil      1.6 - 8.3 10e9/L 4.8   Absolute  Lymphocytes      0.8 - 5.3 10e9/L 1.2   Absolute Monocytes      0.0 - 1.3 10e9/L 0.5   Absolute Eosinophils      0.0 - 0.7 10e9/L 1.9 (H)   Absolute Basophils      0.0 - 0.2 10e9/L 0.1   Abs Immature Granulocytes      0 - 0.4 10e9/L 0.0   Absolute Nucleated RBC       0.0   Color Urine       Straw   Appearance Urine       Clear   Glucose Urine      NEG:Negative mg/dL Negative   Bilirubin Urine      NEG:Negative Negative   Ketones Urine      NEG:Negative mg/dL Negative   Specific Gravity Urine      1.003 - 1.035 1.002 (L)   Blood Urine      NEG:Negative Negative   pH Urine      5.0 - 7.0 pH 6.0   Protein Albumin Urine      NEG:Negative mg/dL Negative   Urobilinogen mg/dL      0.0 - 2.0 mg/dL 0.0   Nitrite Urine      NEG:Negative Negative   Leukocyte Esterase Urine      NEG:Negative Negative   Source       Midstream Urine   WBC Urine      0 - 5 /HPF 1   RBC Urine      0 - 2 /HPF <1   Mucous Urine      NEG:Negative /LPF Present (A)   Creatinine      0.52 - 1.04 mg/dL 1.02   GFR Estimate      >60 mL/min/1.7m2 56 (L)   GFR Estimate If Black      >60 mL/min/1.7m2 68   AST      0 - 45 U/L 24   ALT      0 - 50 U/L 27   Albumin      3.4 - 5.0 g/dL 3.8     Impression:    EGPA/Churg-Kip Syndrome-with only intermittent allergy and sinus problems. No evidence of vasculitis relapse. She continues to tolerate the azathioprine well, and we will continue this maintenance therapy.    Nasal polyps-with recurrent eustachian tube dysfunction. She will follow up in ENT.     Asthma-stable and she will follow up in pulmonary medicine.    Long term monitoring of immune suppression-plan laboratory testing today. She has not yet had her shingrix vaccination.    Plan RTC in 6 months.    Again, thank you for allowing me to participate in the care of your patient.      Sincerely,    Master Mendieta MD

## 2019-05-14 NOTE — PROGRESS NOTES
Ms. 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 This has been associated with asthma, eosinophilia (26%), chronic sinusitis and allergic rhinitis. P-ANCA positive. Treatment with Solumedrol 1 gm IV x 2 and Cyclophosphamide 1.25 gm IV infusion beginning on 12/3/2004, and discontinued after three doses.  She continues the azathioprine 50 mg daily for increases in eosinophilia. Previous bisphosphonate. She stopped HRT at age 53 (was on for 7 years).     She denies any muscle weakness. No loss of sensation or paresthesia. No fevers, chills or sweats. No ulcers or dysphagia. She has ear plugging, and uses a regimen of budesonide to help this. No ear pain.     She continues to exercise with walking and stretching.     Azathioprine 50 mg daily and she tolerates this well.  She had a sinus infection last October, but no infection since then.    PMH   Medical: Asthma since  requiring high dose prednisone at times. Chronic sinusitis and allergic/eosinophillic rhinitis for many years, Scoliosis, nasal polyps, ocular migraine, GERD, tympanic membrane rupture, posterior vitreous detachment right eye, osteopenia (T = -1.4 )  Surgery: Cervical colposcopy; T and A; nasal polypectomy  Injuries: clavical fracture, rib fracture.      Family Hx   No asthma or inflammatory disease.  Father with type II diabetes, adult onset hydrocephalus, TIA/CVA, osteoarthritis, dementia  Brother and mother both  with brain tumor.   Aunt with bone cancer.   Sister with Grave's disease.  Sister with premature irritable bowel OA, HTN, and PVCs  Paternal Aunt with PMR, osteoporosis.  Great grandmother with RA.     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   no children. No EtOH or tobacco. Right handed.    PMSF history  personally obtained and updated by me today.     ROS:  +plugged up ears, but no ear pain  +minor wheezing and coughing; BASSETT in the yard working  +Allergy to PCN and SULFA  Remainder of the 14 point ROS obtained and found negative.     Physical Exam   Constitutional: WD-WN-WG cooperative, NAD  Eyes: PERRLA,  EOMI, anichteric sclera, nl conjunctiva  ENT: mouth clear, normal lips, teeth, gums, sinus, nl TM's  Neck: no mass or thyroid enlargement, no JVD  Resp: lungs clear to auscultation, normal effort, nl to palpation  CV: RRR, no murmurs, rubs or gallops, no edema  GI: no ABD mass or tenderness, soft  Lymph: no cervical or epitrochlear nodes  MS: All TMJ, neck, shoulder, elbow, wrist, hand, spine, hip, knee, ankle, and foot joints were examined and otherwise found normal. Normal  strength. No active synovitis or deformity. Full strength and ROM.  Skin: no rashes or lesions, nl nails  Neuro: nl sensation and strength  Psych: nl judgement, orientation, memory, affect.    Laboratory:    Component      Latest Ref Rng & Units 11/13/2018   WBC      4.0 - 11.0 10e9/L 8.5   RBC Count      3.8 - 5.2 10e12/L 4.97   Hemoglobin      11.7 - 15.7 g/dL 15.2   Hematocrit      35.0 - 47.0 % 46.1   MCV      78 - 100 fl 93   MCH      26.5 - 33.0 pg 30.6   MCHC      31.5 - 36.5 g/dL 33.0   RDW      10.0 - 15.0 % 13.1   Platelet Count      150 - 450 10e9/L 266   Diff Method       Automated Method   % Neutrophils      % 56.3   % Lymphocytes      % 14.1   % Monocytes      % 5.8   % Eosinophils      % 22.2   % Basophils      % 1.4   % Immature Granulocytes      % 0.2   Nucleated RBCs      0 /100 0   Absolute Neutrophil      1.6 - 8.3 10e9/L 4.8   Absolute Lymphocytes      0.8 - 5.3 10e9/L 1.2   Absolute Monocytes      0.0 - 1.3 10e9/L 0.5   Absolute Eosinophils      0.0 - 0.7 10e9/L 1.9 (H)   Absolute Basophils      0.0 - 0.2 10e9/L 0.1   Abs Immature Granulocytes      0 - 0.4 10e9/L 0.0   Absolute Nucleated RBC       0.0   Color  Urine       Straw   Appearance Urine       Clear   Glucose Urine      NEG:Negative mg/dL Negative   Bilirubin Urine      NEG:Negative Negative   Ketones Urine      NEG:Negative mg/dL Negative   Specific Gravity Urine      1.003 - 1.035 1.002 (L)   Blood Urine      NEG:Negative Negative   pH Urine      5.0 - 7.0 pH 6.0   Protein Albumin Urine      NEG:Negative mg/dL Negative   Urobilinogen mg/dL      0.0 - 2.0 mg/dL 0.0   Nitrite Urine      NEG:Negative Negative   Leukocyte Esterase Urine      NEG:Negative Negative   Source       Midstream Urine   WBC Urine      0 - 5 /HPF 1   RBC Urine      0 - 2 /HPF <1   Mucous Urine      NEG:Negative /LPF Present (A)   Creatinine      0.52 - 1.04 mg/dL 1.02   GFR Estimate      >60 mL/min/1.7m2 56 (L)   GFR Estimate If Black      >60 mL/min/1.7m2 68   AST      0 - 45 U/L 24   ALT      0 - 50 U/L 27   Albumin      3.4 - 5.0 g/dL 3.8     Impression:    EGPA/Churg-Kip Syndrome-with only intermittent allergy and sinus problems. No evidence of vasculitis relapse. She continues to tolerate the azathioprine well, and we will continue this maintenance therapy.    Nasal polyps-with recurrent eustachian tube dysfunction. She will follow up in ENT.     Asthma-stable and she will follow up in pulmonary medicine.    Long term monitoring of immune suppression-plan laboratory testing today. She has not yet had her shingrix vaccination.    Plan RTC in 6 months.

## 2019-05-15 DIAGNOSIS — Z79.60 LONG-TERM USE OF IMMUNOSUPPRESSANT MEDICATION: ICD-10-CM

## 2019-05-15 DIAGNOSIS — J45.30 MILD PERSISTENT ASTHMA, UNSPECIFIED WHETHER COMPLICATED: ICD-10-CM

## 2019-05-15 DIAGNOSIS — D72.18 CHURG-STRAUSS SYNDROME (H): ICD-10-CM

## 2019-05-15 DIAGNOSIS — M30.1 CHURG-STRAUSS SYNDROME (H): ICD-10-CM

## 2019-05-17 RX ORDER — AZATHIOPRINE 50 MG/1
50 TABLET ORAL DAILY
Qty: 90 TABLET | Refills: 1 | OUTPATIENT
Start: 2019-05-17

## 2019-08-16 DIAGNOSIS — M30.1 CHURG-STRAUSS SYNDROME (H): ICD-10-CM

## 2019-08-16 DIAGNOSIS — D72.18 CHURG-STRAUSS SYNDROME (H): ICD-10-CM

## 2019-08-18 RX ORDER — BUDESONIDE 0.5 MG/2ML
INHALANT ORAL
Qty: 360 ML | Refills: 1 | OUTPATIENT
Start: 2019-08-18

## 2019-08-18 NOTE — TELEPHONE ENCOUNTER
budesonide (PULMICORT) 0.5 MG/2ML neb solution  Last Written Prescription Date:  10/3/18  Last Fill Quantity: 180ml,   # refills: 3  Last Office Visit :4/11/18  Future Office visit:  None    Scheduling has been notified to contact the pt for appointment.

## 2019-10-07 ENCOUNTER — DOCUMENTATION ONLY (OUTPATIENT)
Dept: CARE COORDINATION | Facility: CLINIC | Age: 59
End: 2019-10-07

## 2019-11-01 ENCOUNTER — TELEPHONE (OUTPATIENT)
Dept: RHEUMATOLOGY | Facility: CLINIC | Age: 59
End: 2019-11-01

## 2019-11-05 ENCOUNTER — APPOINTMENT (OUTPATIENT)
Dept: LAB | Facility: CLINIC | Age: 59
End: 2019-11-05
Payer: COMMERCIAL

## 2019-11-05 ENCOUNTER — OFFICE VISIT (OUTPATIENT)
Dept: RHEUMATOLOGY | Facility: CLINIC | Age: 59
End: 2019-11-05
Attending: INTERNAL MEDICINE
Payer: COMMERCIAL

## 2019-11-05 VITALS
DIASTOLIC BLOOD PRESSURE: 83 MMHG | HEART RATE: 67 BPM | SYSTOLIC BLOOD PRESSURE: 128 MMHG | HEIGHT: 64 IN | BODY MASS INDEX: 23.46 KG/M2 | TEMPERATURE: 97.6 F | WEIGHT: 137.4 LBS

## 2019-11-05 DIAGNOSIS — D72.18 CHURG-STRAUSS SYNDROME (H): Primary | ICD-10-CM

## 2019-11-05 DIAGNOSIS — J45.30 MILD PERSISTENT ASTHMA, UNSPECIFIED WHETHER COMPLICATED: ICD-10-CM

## 2019-11-05 DIAGNOSIS — Z79.60 LONG-TERM USE OF IMMUNOSUPPRESSANT MEDICATION: ICD-10-CM

## 2019-11-05 DIAGNOSIS — M30.1 CHURG-STRAUSS SYNDROME (H): Primary | ICD-10-CM

## 2019-11-05 LAB
ALBUMIN SERPL-MCNC: 4.1 G/DL (ref 3.4–5)
ALBUMIN UR-MCNC: NEGATIVE MG/DL
ALT SERPL W P-5'-P-CCNC: 23 U/L (ref 0–50)
APPEARANCE UR: CLEAR
AST SERPL W P-5'-P-CCNC: 18 U/L (ref 0–45)
BASOPHILS # BLD AUTO: 0.1 10E9/L (ref 0–0.2)
BASOPHILS NFR BLD AUTO: 1.1 %
BILIRUB UR QL STRIP: NEGATIVE
COLOR UR AUTO: ABNORMAL
CREAT SERPL-MCNC: 1 MG/DL (ref 0.52–1.04)
DIFFERENTIAL METHOD BLD: ABNORMAL
EOSINOPHIL # BLD AUTO: 0.9 10E9/L (ref 0–0.7)
EOSINOPHIL NFR BLD AUTO: 12.6 %
ERYTHROCYTE [DISTWIDTH] IN BLOOD BY AUTOMATED COUNT: 13 % (ref 10–15)
GFR SERPL CREATININE-BSD FRML MDRD: 62 ML/MIN/{1.73_M2}
GLUCOSE UR STRIP-MCNC: NEGATIVE MG/DL
HCT VFR BLD AUTO: 46.4 % (ref 35–47)
HGB BLD-MCNC: 15.6 G/DL (ref 11.7–15.7)
HGB UR QL STRIP: NEGATIVE
IMM GRANULOCYTES # BLD: 0 10E9/L (ref 0–0.4)
IMM GRANULOCYTES NFR BLD: 0.4 %
KETONES UR STRIP-MCNC: NEGATIVE MG/DL
LEUKOCYTE ESTERASE UR QL STRIP: NEGATIVE
LYMPHOCYTES # BLD AUTO: 1.2 10E9/L (ref 0.8–5.3)
LYMPHOCYTES NFR BLD AUTO: 15.9 %
MCH RBC QN AUTO: 31.3 PG (ref 26.5–33)
MCHC RBC AUTO-ENTMCNC: 33.6 G/DL (ref 31.5–36.5)
MCV RBC AUTO: 93 FL (ref 78–100)
MONOCYTES # BLD AUTO: 0.4 10E9/L (ref 0–1.3)
MONOCYTES NFR BLD AUTO: 5.2 %
MUCOUS THREADS #/AREA URNS LPF: PRESENT /LPF
NEUTROPHILS # BLD AUTO: 4.7 10E9/L (ref 1.6–8.3)
NEUTROPHILS NFR BLD AUTO: 64.8 %
NITRATE UR QL: NEGATIVE
NRBC # BLD AUTO: 0 10*3/UL
NRBC BLD AUTO-RTO: 0 /100
PH UR STRIP: 6 PH (ref 5–7)
PLATELET # BLD AUTO: 218 10E9/L (ref 150–450)
RBC # BLD AUTO: 4.99 10E12/L (ref 3.8–5.2)
RBC #/AREA URNS AUTO: 2 /HPF (ref 0–2)
SOURCE: ABNORMAL
SP GR UR STRIP: 1 (ref 1–1.03)
UROBILINOGEN UR STRIP-MCNC: 0 MG/DL (ref 0–2)
WBC # BLD AUTO: 7.2 10E9/L (ref 4–11)
WBC #/AREA URNS AUTO: <1 /HPF (ref 0–5)

## 2019-11-05 PROCEDURE — 85025 COMPLETE CBC W/AUTO DIFF WBC: CPT | Performed by: INTERNAL MEDICINE

## 2019-11-05 PROCEDURE — 84460 ALANINE AMINO (ALT) (SGPT): CPT | Performed by: INTERNAL MEDICINE

## 2019-11-05 PROCEDURE — 84450 TRANSFERASE (AST) (SGOT): CPT | Performed by: INTERNAL MEDICINE

## 2019-11-05 PROCEDURE — G0463 HOSPITAL OUTPT CLINIC VISIT: HCPCS | Mod: ZF

## 2019-11-05 PROCEDURE — 81001 URINALYSIS AUTO W/SCOPE: CPT | Performed by: INTERNAL MEDICINE

## 2019-11-05 PROCEDURE — 82040 ASSAY OF SERUM ALBUMIN: CPT | Performed by: INTERNAL MEDICINE

## 2019-11-05 PROCEDURE — 82565 ASSAY OF CREATININE: CPT | Performed by: INTERNAL MEDICINE

## 2019-11-05 PROCEDURE — 36415 COLL VENOUS BLD VENIPUNCTURE: CPT | Performed by: INTERNAL MEDICINE

## 2019-11-05 RX ORDER — AZATHIOPRINE 50 MG/1
50 TABLET ORAL DAILY
Qty: 90 TABLET | Refills: 1 | Status: SHIPPED | OUTPATIENT
Start: 2019-11-05 | End: 2020-05-19

## 2019-11-05 ASSESSMENT — PAIN SCALES - GENERAL: PAINLEVEL: NO PAIN (0)

## 2019-11-05 ASSESSMENT — MIFFLIN-ST. JEOR: SCORE: 1180.3

## 2019-11-05 NOTE — PATIENT INSTRUCTIONS
Preventive Care:    Colorectal Cancer Screening: During our visit today, we discussed that it is recommended you receive colorectal cancer screening. Please call or make an appointment with your primary care provider to discuss this. You may also call the [facility] scheduling line ([phone]) to set up a colonoscopy appointment.

## 2019-11-05 NOTE — NURSING NOTE
"/83   Pulse 67   Temp 97.6  F (36.4  C) (Oral)   Ht 1.613 m (5' 3.5\")   Wt 62.3 kg (137 lb 6.4 oz)   BMI 23.96 kg/m    Chief Complaint   Patient presents with     RECHECK     follow up with churg andrea syndrome, tzimmer cma       "

## 2019-11-05 NOTE — PROGRESS NOTES
Ms. 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 This has been associated with asthma, eosinophilia (26%), chronic sinusitis and allergic rhinitis. P-ANCA positive. Treatment with Solumedrol 1 gm IV x 2 and Cyclophosphamide 1.25 gm IV infusion beginning on 12/3/2004, and discontinued after three doses.  She continues the azathioprine 50 mg daily for increases in eosinophilia. Previous bisphosphonate. She stopped HRT at age 53 (was on for 7 years).     She is doing very well. No recent asthma flares and her nasal symptoms are stable. No weakness, numbness or change in peripheral nerves. Energy is good. No recent triggers for asthma. She continues to exercise with good tolerance.     Azathioprine 50 mg daily and she tolerates this well with no stomach upset.  Now off of prednisone. Minor cold symptoms in September.     PMH   Medical: Asthma since  requiring high dose prednisone at times. Chronic sinusitis and allergic/eosinophillic rhinitis for many years, Scoliosis, nasal polyps, ocular migraine, GERD, tympanic membrane rupture, posterior vitreous detachment right eye, osteopenia (T = -1.4 )  Surgery: Cervical colposcopy; T and A; nasal polypectomy  Injuries: clavical fracture, rib fracture.      Family Hx   No asthma or inflammatory disease.  Father with type II diabetes, adult onset hydrocephalus, TIA/CVA, osteoarthritis, dementia  Brother and mother both  with brain tumor.   Aunt with bone cancer.   Sister with Grave's disease.  Sister with premature irritable bowel OA, HTN, and PVCs  Paternal Aunt with PMR, osteoporosis.  Great grandmother with RA.     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   no children. No EtOH or tobacco. Right handed.    PMSF history personally obtained and  updated by me today.     ROS:  +Allergy to PCN and SULFA  Remainder of the 14 point ROS obtained and found negative.     Physical Exam   Constitutional: WD-WN-WG cooperative, NAD  Eyes: PERRLA,  EOMI, anichteric sclera, nl conjunctiva  ENT: mouth clear, normal lips, teeth, gums, sinus, nl TM's  Neck: no mass or thyroid enlargement, no JVD  Resp: lungs clear to auscultation, no wheezing, normal effort, nl to palpation  CV: RRR, no murmurs, rubs or gallops, no edema  GI: no ABD mass or tenderness, soft  Lymph: no cervical or epitrochlear nodes  MS: All TMJ, neck, shoulder, elbow, wrist, hand, spine, hip, knee, ankle, and foot joints were examined and otherwise found normal. Normal  strength. No active synovitis or deformity. Full strength and ROM.  Skin: no rashes or lesions, nl nails  Neuro: nl sensation and strength  Psych: nl judgement, orientation, memory, affect.    Laboratory:      Component      Latest Ref Rng & Units 5/14/2019   WBC      4.0 - 11.0 10e9/L 7.0   RBC Count      3.8 - 5.2 10e12/L 4.99   Hemoglobin      11.7 - 15.7 g/dL 15.5   Hematocrit      35.0 - 47.0 % 46.1   MCV      78 - 100 fl 92   MCH      26.5 - 33.0 pg 31.1   MCHC      31.5 - 36.5 g/dL 33.6   RDW      10.0 - 15.0 % 13.6   Platelet Count      150 - 450 10e9/L 231   Diff Method       Automated Method   % Neutrophils      % 59.4   % Lymphocytes      % 14.5   % Monocytes      % 5.3   % Eosinophils      % 19.3   % Basophils      % 1.4   % Immature Granulocytes      % 0.1   Nucleated RBCs      0 /100 0   Absolute Neutrophil      1.6 - 8.3 10e9/L 4.1   Absolute Lymphocytes      0.8 - 5.3 10e9/L 1.0   Absolute Monocytes      0.0 - 1.3 10e9/L 0.4   Absolute Eosinophils      0.0 - 0.7 10e9/L 1.4 (H)   Absolute Basophils      0.0 - 0.2 10e9/L 0.1   Abs Immature Granulocytes      0 - 0.4 10e9/L 0.0   Absolute Nucleated RBC       0.0   Color Urine       Straw   Appearance Urine       Clear   Glucose Urine      NEG:Negative mg/dL Negative    Bilirubin Urine      NEG:Negative Negative   Ketones Urine      NEG:Negative mg/dL Negative   Specific Gravity Urine      1.003 - 1.035 1.004   Blood Urine      NEG:Negative Negative   pH Urine      5.0 - 7.0 pH 6.0   Protein Albumin Urine      NEG:Negative mg/dL Negative   Urobilinogen mg/dL      0.0 - 2.0 mg/dL 0.0   Nitrite Urine      NEG:Negative Negative   Leukocyte Esterase Urine      NEG:Negative Negative   Source       Midstream Urine   WBC Urine      0 - 5 /HPF 0   RBC Urine      0 - 2 /HPF 0   Creatinine      0.52 - 1.04 mg/dL 0.98   GFR Estimate      >60 mL/min/1.73:m2 64   GFR Estimate If Black      >60 mL/min/1.73:m2 74   Albumin      3.4 - 5.0 g/dL 4.0   ALT      0 - 50 U/L 22   AST      0 - 45 U/L 18     Component Absolute Eosinophils   Latest Ref Rng & Units 0.0 - 0.7 10e9/L   1/13/2009 0.2   3/9/2009 0.6   5/12/2009 1.4 (H)   9/15/2009 1.8 (H)   11/10/2009 1.0 (H)   3/9/2010 1.9 (H)   6/10/2010 2.3 (H)   7/27/2010 0.6   9/14/2010 1.4 (H)   11/17/2010 0.5   1/25/2011 1.7 (H)   2/8/2011 0.1   3/29/2011 0.7   5/24/2011 1.2 (H)   7/26/2011 0.7   9/9/2011 0.6   10/4/2011 0.0   12/13/2011 1.1 (H)   4/25/2012 0.6   7/10/2012 0.6   10/10/2012 0.8 (H)   1/8/2013 1.5 (H)   5/21/2013 0.2   7/9/2013 1.7 (H)   10/9/2013 0.1   1/14/2014 0.1   4/28/2014 0.3   7/15/2014 0.4   10/8/2014 0.6   1/13/2015 0.4   7/14/2015 1.7 (H)   10/20/2015 1.7 (H)   11/17/2015 0.2   1/12/2016 0.1   5/17/2016 0.5   11/22/2016 0.8 (H)   5/23/2017 1.0 (H)   11/14/2017 0.8 (H)   5/15/2018 1.2 (H)   11/13/2018 1.9 (H)   5/14/2019 1.4 (H)     Impression:    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 Today there are no overt signs of inflammation and it is my impression that she is in remission. Good tolerance of the azathioprine 50 mg daily and we will continue this agent.    Nasal polyps-stable.    Asthma-stable.    Long term monitoring of immune suppression-with flu immunization recently. Plan lab  testing today and every 3 months. She will consider a repeat pneumovax-23.    Plan RTC in 6 months.

## 2019-11-05 NOTE — LETTER
2019      RE: Angelina Trinh  [address] [place] [id number]       Ms. 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 This has been associated with asthma, eosinophilia (26%), chronic sinusitis and allergic rhinitis. P-ANCA positive. Treatment with Solumedrol 1 gm IV x 2 and Cyclophosphamide 1.25 gm IV infusion beginning on 12/3/2004, and discontinued after three doses.  She continues the azathioprine 50 mg daily for increases in eosinophilia. Previous bisphosphonate. She stopped HRT at age 53 (was on for 7 years).     She is doing very well. No recent asthma flares and her nasal symptoms are stable. No weakness, numbness or change in peripheral nerves. Energy is good. No recent triggers for asthma. She continues to exercise with good tolerance.     Azathioprine 50 mg daily and she tolerates this well with no stomach upset.  Now off of prednisone. Minor cold symptoms in September.     PMH   Medical: Asthma since  requiring high dose prednisone at times. Chronic sinusitis and allergic/eosinophillic rhinitis for many years, Scoliosis, nasal polyps, ocular migraine, GERD, tympanic membrane rupture, posterior vitreous detachment right eye, osteopenia (T = -1.4 )  Surgery: Cervical colposcopy; T and A; nasal polypectomy  Injuries: clavical fracture, rib fracture.      Family Hx   No asthma or inflammatory disease.  Father with type II diabetes, adult onset hydrocephalus, TIA/CVA, osteoarthritis, dementia  Brother and mother both  with brain tumor.   Aunt with bone cancer.   Sister with Grave's disease.  Sister with premature irritable bowel OA, HTN, and PVCs  Paternal Aunt with PMR, osteoporosis.  Great grandmother with RA.     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    no children. No EtOH or tobacco. Right handed.    PMSF history personally obtained and updated by me today.     ROS:  +Allergy to PCN and SULFA  Remainder of the 14 point ROS obtained and found negative.     Physical Exam   Constitutional: WD-WN-WG cooperative, NAD  Eyes: PERRLA,  EOMI, anichteric sclera, nl conjunctiva  ENT: mouth clear, normal lips, teeth, gums, sinus, nl TM's  Neck: no mass or thyroid enlargement, no JVD  Resp: lungs clear to auscultation, no wheezing, normal effort, nl to palpation  CV: RRR, no murmurs, rubs or gallops, no edema  GI: no ABD mass or tenderness, soft  Lymph: no cervical or epitrochlear nodes  MS: All TMJ, neck, shoulder, elbow, wrist, hand, spine, hip, knee, ankle, and foot joints were examined and otherwise found normal. Normal  strength. No active synovitis or deformity. Full strength and ROM.  Skin: no rashes or lesions, nl nails  Neuro: nl sensation and strength  Psych: nl judgement, orientation, memory, affect.    Laboratory:      Component      Latest Ref Rng & Units 5/14/2019   WBC      4.0 - 11.0 10e9/L 7.0   RBC Count      3.8 - 5.2 10e12/L 4.99   Hemoglobin      11.7 - 15.7 g/dL 15.5   Hematocrit      35.0 - 47.0 % 46.1   MCV      78 - 100 fl 92   MCH      26.5 - 33.0 pg 31.1   MCHC      31.5 - 36.5 g/dL 33.6   RDW      10.0 - 15.0 % 13.6   Platelet Count      150 - 450 10e9/L 231   Diff Method       Automated Method   % Neutrophils      % 59.4   % Lymphocytes      % 14.5   % Monocytes      % 5.3   % Eosinophils      % 19.3   % Basophils      % 1.4   % Immature Granulocytes      % 0.1   Nucleated RBCs      0 /100 0   Absolute Neutrophil      1.6 - 8.3 10e9/L 4.1   Absolute Lymphocytes      0.8 - 5.3 10e9/L 1.0   Absolute Monocytes      0.0 - 1.3 10e9/L 0.4   Absolute Eosinophils      0.0 - 0.7 10e9/L 1.4 (H)   Absolute Basophils      0.0 - 0.2 10e9/L 0.1   Abs Immature Granulocytes      0 - 0.4 10e9/L 0.0   Absolute Nucleated RBC       0.0   Color Urine        Straw   Appearance Urine       Clear   Glucose Urine      NEG:Negative mg/dL Negative   Bilirubin Urine      NEG:Negative Negative   Ketones Urine      NEG:Negative mg/dL Negative   Specific Gravity Urine      1.003 - 1.035 1.004   Blood Urine      NEG:Negative Negative   pH Urine      5.0 - 7.0 pH 6.0   Protein Albumin Urine      NEG:Negative mg/dL Negative   Urobilinogen mg/dL      0.0 - 2.0 mg/dL 0.0   Nitrite Urine      NEG:Negative Negative   Leukocyte Esterase Urine      NEG:Negative Negative   Source       Midstream Urine   WBC Urine      0 - 5 /HPF 0   RBC Urine      0 - 2 /HPF 0   Creatinine      0.52 - 1.04 mg/dL 0.98   GFR Estimate      >60 mL/min/1.73:m2 64   GFR Estimate If Black      >60 mL/min/1.73:m2 74   Albumin      3.4 - 5.0 g/dL 4.0   ALT      0 - 50 U/L 22   AST      0 - 45 U/L 18     Component Absolute Eosinophils   Latest Ref Rng & Units 0.0 - 0.7 10e9/L   1/13/2009 0.2   3/9/2009 0.6   5/12/2009 1.4 (H)   9/15/2009 1.8 (H)   11/10/2009 1.0 (H)   3/9/2010 1.9 (H)   6/10/2010 2.3 (H)   7/27/2010 0.6   9/14/2010 1.4 (H)   11/17/2010 0.5   1/25/2011 1.7 (H)   2/8/2011 0.1   3/29/2011 0.7   5/24/2011 1.2 (H)   7/26/2011 0.7   9/9/2011 0.6   10/4/2011 0.0   12/13/2011 1.1 (H)   4/25/2012 0.6   7/10/2012 0.6   10/10/2012 0.8 (H)   1/8/2013 1.5 (H)   5/21/2013 0.2   7/9/2013 1.7 (H)   10/9/2013 0.1   1/14/2014 0.1   4/28/2014 0.3   7/15/2014 0.4   10/8/2014 0.6   1/13/2015 0.4   7/14/2015 1.7 (H)   10/20/2015 1.7 (H)   11/17/2015 0.2   1/12/2016 0.1   5/17/2016 0.5   11/22/2016 0.8 (H)   5/23/2017 1.0 (H)   11/14/2017 0.8 (H)   5/15/2018 1.2 (H)   11/13/2018 1.9 (H)   5/14/2019 1.4 (H)     Impression:    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 Today there are no overt signs of inflammation and it is my impression that she is in remission. Good tolerance of the azathioprine 50 mg daily and we will continue this agent.    Nasal  polyps-stable.    Asthma-stable.    Long term monitoring of immune suppression-with flu immunization recently. Plan lab testing today and every 3 months. She will consider a repeat pneumovax-23.    Plan RTC in 6 months.        Master Mendieta MD

## 2019-11-05 NOTE — LETTER
2019       RE: Angelina Trinh  [address] [place] [id number]     Dear Colleague,    Thank you for referring your patient, Angelina Trinh, to the [facility] RHEUMATOLOGY at [facility]. Please see a copy of my visit note below.    Ms. 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 This has been associated with asthma, eosinophilia (26%), chronic sinusitis and allergic rhinitis. P-ANCA positive. Treatment with Solumedrol 1 gm IV x 2 and Cyclophosphamide 1.25 gm IV infusion beginning on 12/3/2004, and discontinued after three doses.  She continues the azathioprine 50 mg daily for increases in eosinophilia. Previous bisphosphonate. She stopped HRT at age 53 (was on for 7 years).     She is doing very well. No recent asthma flares and her nasal symptoms are stable. No weakness, numbness or change in peripheral nerves. Energy is good. No recent triggers for asthma. She continues to exercise with good tolerance.     Azathioprine 50 mg daily and she tolerates this well with no stomach upset.  Now off of prednisone. Minor cold symptoms in September.     PMH   Medical: Asthma since  requiring high dose prednisone at times. Chronic sinusitis and allergic/eosinophillic rhinitis for many years, Scoliosis, nasal polyps, ocular migraine, GERD, tympanic membrane rupture, posterior vitreous detachment right eye, osteopenia (T = -1.4 )  Surgery: Cervical colposcopy; T and A; nasal polypectomy  Injuries: clavical fracture, rib fracture.      Family Hx   No asthma or inflammatory disease.  Father with type II diabetes, adult onset hydrocephalus, TIA/CVA, osteoarthritis, dementia  Brother and mother both  with brain tumor.   Aunt with bone cancer.   Sister with Grave's disease.  Sister with premature irritable bowel OA, HTN, and  PVCs  Paternal Aunt with PMR, osteoporosis.  Great grandmother with RA.     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   no children. No EtOH or tobacco. Right handed.    PMSF history personally obtained and updated by me today.     ROS:  +Allergy to PCN and SULFA  Remainder of the 14 point ROS obtained and found negative.     Physical Exam   Constitutional: WD-WN-WG cooperative, NAD  Eyes: PERRLA,  EOMI, anichteric sclera, nl conjunctiva  ENT: mouth clear, normal lips, teeth, gums, sinus, nl TM's  Neck: no mass or thyroid enlargement, no JVD  Resp: lungs clear to auscultation, no wheezing, normal effort, nl to palpation  CV: RRR, no murmurs, rubs or gallops, no edema  GI: no ABD mass or tenderness, soft  Lymph: no cervical or epitrochlear nodes  MS: All TMJ, neck, shoulder, elbow, wrist, hand, spine, hip, knee, ankle, and foot joints were examined and otherwise found normal. Normal  strength. No active synovitis or deformity. Full strength and ROM.  Skin: no rashes or lesions, nl nails  Neuro: nl sensation and strength  Psych: nl judgement, orientation, memory, affect.    Laboratory:      Component      Latest Ref Rng & Units 5/14/2019   WBC      4.0 - 11.0 10e9/L 7.0   RBC Count      3.8 - 5.2 10e12/L 4.99   Hemoglobin      11.7 - 15.7 g/dL 15.5   Hematocrit      35.0 - 47.0 % 46.1   MCV      78 - 100 fl 92   MCH      26.5 - 33.0 pg 31.1   MCHC      31.5 - 36.5 g/dL 33.6   RDW      10.0 - 15.0 % 13.6   Platelet Count      150 - 450 10e9/L 231   Diff Method       Automated Method   % Neutrophils      % 59.4   % Lymphocytes      % 14.5   % Monocytes      % 5.3   % Eosinophils      % 19.3   % Basophils      % 1.4   % Immature Granulocytes      % 0.1   Nucleated RBCs      0 /100 0   Absolute Neutrophil      1.6 - 8.3 10e9/L 4.1   Absolute Lymphocytes      0.8 - 5.3 10e9/L 1.0   Absolute Monocytes      0.0 - 1.3 10e9/L 0.4    Absolute Eosinophils      0.0 - 0.7 10e9/L 1.4 (H)   Absolute Basophils      0.0 - 0.2 10e9/L 0.1   Abs Immature Granulocytes      0 - 0.4 10e9/L 0.0   Absolute Nucleated RBC       0.0   Color Urine       Straw   Appearance Urine       Clear   Glucose Urine      NEG:Negative mg/dL Negative   Bilirubin Urine      NEG:Negative Negative   Ketones Urine      NEG:Negative mg/dL Negative   Specific Gravity Urine      1.003 - 1.035 1.004   Blood Urine      NEG:Negative Negative   pH Urine      5.0 - 7.0 pH 6.0   Protein Albumin Urine      NEG:Negative mg/dL Negative   Urobilinogen mg/dL      0.0 - 2.0 mg/dL 0.0   Nitrite Urine      NEG:Negative Negative   Leukocyte Esterase Urine      NEG:Negative Negative   Source       Midstream Urine   WBC Urine      0 - 5 /HPF 0   RBC Urine      0 - 2 /HPF 0   Creatinine      0.52 - 1.04 mg/dL 0.98   GFR Estimate      >60 mL/min/1.73:m2 64   GFR Estimate If Black      >60 mL/min/1.73:m2 74   Albumin      3.4 - 5.0 g/dL 4.0   ALT      0 - 50 U/L 22   AST      0 - 45 U/L 18     Component Absolute Eosinophils   Latest Ref Rng & Units 0.0 - 0.7 10e9/L   1/13/2009 0.2   3/9/2009 0.6   5/12/2009 1.4 (H)   9/15/2009 1.8 (H)   11/10/2009 1.0 (H)   3/9/2010 1.9 (H)   6/10/2010 2.3 (H)   7/27/2010 0.6   9/14/2010 1.4 (H)   11/17/2010 0.5   1/25/2011 1.7 (H)   2/8/2011 0.1   3/29/2011 0.7   5/24/2011 1.2 (H)   7/26/2011 0.7   9/9/2011 0.6   10/4/2011 0.0   12/13/2011 1.1 (H)   4/25/2012 0.6   7/10/2012 0.6   10/10/2012 0.8 (H)   1/8/2013 1.5 (H)   5/21/2013 0.2   7/9/2013 1.7 (H)   10/9/2013 0.1   1/14/2014 0.1   4/28/2014 0.3   7/15/2014 0.4   10/8/2014 0.6   1/13/2015 0.4   7/14/2015 1.7 (H)   10/20/2015 1.7 (H)   11/17/2015 0.2   1/12/2016 0.1   5/17/2016 0.5   11/22/2016 0.8 (H)   5/23/2017 1.0 (H)   11/14/2017 0.8 (H)   5/15/2018 1.2 (H)   11/13/2018 1.9 (H)   5/14/2019 1.4 (H)     Impression:    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 Today there are  no overt signs of inflammation and it is my impression that she is in remission. Good tolerance of the azathioprine 50 mg daily and we will continue this agent.    Nasal polyps-stable.    Asthma-stable.    Long term monitoring of immune suppression-with flu immunization recently. Plan lab testing today and every 3 months. She will consider a repeat pneumovax-23.    Plan RTC in 6 months.    Again, thank you for allowing me to participate in the care of your patient.      Sincerely,    Master Mendieta MD

## 2019-11-06 ENCOUNTER — HEALTH MAINTENANCE LETTER (OUTPATIENT)
Age: 59
End: 2019-11-06

## 2020-02-07 DIAGNOSIS — J45.909 ASTHMA: ICD-10-CM

## 2020-02-16 ENCOUNTER — HEALTH MAINTENANCE LETTER (OUTPATIENT)
Age: 60
End: 2020-02-16

## 2020-03-12 DIAGNOSIS — J84.9 ILD (INTERSTITIAL LUNG DISEASE) (H): ICD-10-CM

## 2020-03-12 RX ORDER — ALBUTEROL SULFATE 90 UG/1
1-2 AEROSOL, METERED RESPIRATORY (INHALATION) EVERY 6 HOURS PRN
Qty: 1 INHALER | Refills: 0 | Status: SHIPPED | OUTPATIENT
Start: 2020-03-12

## 2020-03-27 ENCOUNTER — DOCUMENTATION ONLY (OUTPATIENT)
Dept: CARE COORDINATION | Facility: CLINIC | Age: 60
End: 2020-03-27

## 2020-05-19 ENCOUNTER — VIRTUAL VISIT (OUTPATIENT)
Dept: RHEUMATOLOGY | Facility: CLINIC | Age: 60
End: 2020-05-19
Attending: INTERNAL MEDICINE
Payer: COMMERCIAL

## 2020-05-19 DIAGNOSIS — M30.1 CHURG-STRAUSS SYNDROME (H): Primary | ICD-10-CM

## 2020-05-19 DIAGNOSIS — D72.18 CHURG-STRAUSS SYNDROME (H): Primary | ICD-10-CM

## 2020-05-19 DIAGNOSIS — J45.30 MILD PERSISTENT ASTHMA, UNSPECIFIED WHETHER COMPLICATED: ICD-10-CM

## 2020-05-19 DIAGNOSIS — Z79.60 LONG-TERM USE OF IMMUNOSUPPRESSANT MEDICATION: ICD-10-CM

## 2020-05-19 RX ORDER — AZATHIOPRINE 50 MG/1
TABLET ORAL
Qty: 45 TABLET | Refills: 1 | Status: SHIPPED | OUTPATIENT
Start: 2020-05-19 | End: 2020-11-03

## 2020-05-19 ASSESSMENT — PAIN SCALES - GENERAL: PAINLEVEL: NO PAIN (0)

## 2020-05-19 NOTE — PROGRESS NOTES
Left patient a voicemail to call back to set up telemedicine visit.  Bonnie Yu CMA on 5/19/2020 at 9:17 AM

## 2020-05-19 NOTE — PATIENT INSTRUCTIONS
Plan to reduce the azathioprine dosing to one tablet every other day.  Get laboratory testing now and repeat every 3 months.   Plan follow up with me in 6 months.

## 2020-05-19 NOTE — PROGRESS NOTES
"Angelina Trinh is a 59 year old female who is being evaluated via a billable telephone visit.      The patient has been notified of following:     \"This telephone visit will be conducted via a call between you and your physician/provider. We have found that certain health care needs can be provided without the need for a physical exam.  This service lets us provide the care you need with a short phone conversation.  If a prescription is necessary we can send it directly to your pharmacy.  If lab work is needed we can place an order for that and you can then stop by our lab to have the test done at a later time.    Telephone visits are billed at different rates depending on your insurance coverage. During this emergency period, for some insurers they may be billed the same as an in-person visit.  Please reach out to your insurance provider with any questions.    If during the course of the call the physician/provider feels a telephone visit is not appropriate, you will not be charged for this service.\"    Patient has given verbal consent for Telephone visit?  Yes    What phone number would you like to be contacted at? [phone]    How would you like to obtain your AVS? [facility]    Phone call duration: 21 minutes    Master Mendieta MD    Ms. 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 This has been associated with asthma, eosinophilia (26%), chronic sinusitis and allergic rhinitis. P-ANCA positive. Treatment with Solumedrol 1 gm IV x 2 and Cyclophosphamide 1.25 gm IV infusion beginning on 12/3/2004, and discontinued after three doses.  She continues the azathioprine 50 mg daily for increases in eosinophilia. Previous bisphosphonate. She stopped HRT at age 53 (was on for 7 years).     She reports a sinus infection in March but no other infections. Her asthma is stable and no current " prednisone. No recent change in her LE strength. No evidence of relapse. No recent rash or hives. Her energy is good, but she requests a TSH level due to cold intolerance.     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    Azathioprine 50 mg daily and she tolerates this well with no stomach upset. We discussed the risks and benefits of continued azathioprine and we agree to reduce the dose to 25 mg every day.    PMH   Medical: Asthma since  requiring high dose prednisone at times. Chronic sinusitis and allergic/eosinophillic rhinitis for many years, Scoliosis, nasal polyps, ocular migraine, GERD, tympanic membrane rupture, posterior vitreous detachment right eye, osteopenia (T = -1.4 )  Surgery: Cervical colposcopy; T and A; nasal polypectomy  Injuries: clavical fracture, rib fracture.      Family Hx   No asthma or inflammatory disease.  Father with type II diabetes, adult onset hydrocephalus, TIA/CVA, osteoarthritis, dementia  Brother and mother both  with brain tumor.   Aunt with bone cancer.   Sister with Grave's disease.  Sister with premature irritable bowel OA, HTN, and PVCs  Paternal Aunt with PMR, osteoporosis.  Great grandmother with RA.     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   no children. No EtOH or tobacco. Right handed.    PMSF history personally obtained and updated by me today.     ROS:  +Allergy to PCN and SULFA  Remainder of the 14 point ROS obtained and found negative.     Laboratory:    Component      Latest Ref Rng & Units 2019   WBC      4.0 - 11.0 10e9/L 7.2   RBC Count      3.8 - 5.2 10e12/L 4.99   Hemoglobin      11.7 - 15.7 g/dL 15.6   Hematocrit      35.0 - 47.0 % 46.4   MCV      78 - 100 fl 93   MCH      26.5 - 33.0 pg 31.3   MCHC      31.5 - 36.5 g/dL 33.6   RDW      10.0 - 15.0 % 13.0   Platelet  Count      150 - 450 10e9/L 218   Diff Method       Automated Method   % Neutrophils      % 64.8   % Lymphocytes      % 15.9   % Monocytes      % 5.2   % Eosinophils      % 12.6   % Basophils      % 1.1   % Immature Granulocytes      % 0.4   Nucleated RBCs      0 /100 0   Absolute Neutrophil      1.6 - 8.3 10e9/L 4.7   Absolute Lymphocytes      0.8 - 5.3 10e9/L 1.2   Absolute Monocytes      0.0 - 1.3 10e9/L 0.4   Absolute Eosinophils      0.0 - 0.7 10e9/L 0.9 (H)   Absolute Basophils      0.0 - 0.2 10e9/L 0.1   Abs Immature Granulocytes      0 - 0.4 10e9/L 0.0   Absolute Nucleated RBC       0.0   Color Urine       Straw   Appearance Urine       Clear   Glucose Urine      NEG:Negative mg/dL Negative   Bilirubin Urine      NEG:Negative Negative   Ketones Urine      NEG:Negative mg/dL Negative   Specific Gravity Urine      1.003 - 1.035 1.002 (L)   Blood Urine      NEG:Negative Negative   pH Urine      5.0 - 7.0 pH 6.0   Protein Albumin Urine      NEG:Negative mg/dL Negative   Urobilinogen mg/dL      0.0 - 2.0 mg/dL 0.0   Nitrite Urine      NEG:Negative Negative   Leukocyte Esterase Urine      NEG:Negative Negative   Source       Midstream Urine   WBC Urine      0 - 5 /HPF <1   RBC Urine      0 - 2 /HPF 2   Mucous Urine      NEG:Negative /LPF Present (A)   Creatinine      0.52 - 1.04 mg/dL 1.00   GFR Estimate      >60 mL/min/1.73:m2 62   GFR Estimate If Black      >60 mL/min/1.73:m2 72   Albumin      3.4 - 5.0 g/dL 4.1   ALT      0 - 50 U/L 23   AST      0 - 45 U/L 18       Impression:    EGPA/Churg-Kip Syndrome-associated with persistent minor eosinophil elevations. Nevertheless, she has been free of vasculitis relapse for multiple years. We have considered the role of azathioprine in her care in light of new IL-5 directed therapies and the risk of lymphoma with azathioprine and the current COVID-19 pandemic. We have discussed these risks and benefits of azathioprine and agree to reduce the dose to 50 mg every  other day.    Nasal polyps-stable.    Asthma-stable.    Long term monitoring of immune suppression-with flu immunization recently. Plan lab testing today and every 3 months. We also agree that the benefits of continued immunosuppression outweigh the risks of COVID-19 infection.     Plan RTC in 6 months.

## 2020-05-27 DIAGNOSIS — J45.909 ASTHMA: ICD-10-CM

## 2020-05-27 NOTE — TELEPHONE ENCOUNTER
Left message for patient to tell her she needs appointment for refills or to contact PCP to see if they will order.

## 2020-06-17 DIAGNOSIS — D72.18 CHURG-STRAUSS SYNDROME (H): ICD-10-CM

## 2020-06-17 DIAGNOSIS — Z79.60 LONG-TERM USE OF IMMUNOSUPPRESSANT MEDICATION: ICD-10-CM

## 2020-06-17 DIAGNOSIS — M30.1 CHURG-STRAUSS SYNDROME (H): ICD-10-CM

## 2020-06-17 DIAGNOSIS — J45.30 MILD PERSISTENT ASTHMA, UNSPECIFIED WHETHER COMPLICATED: ICD-10-CM

## 2020-06-17 LAB
ALBUMIN SERPL-MCNC: 3.8 G/DL (ref 3.4–5)
ALBUMIN UR-MCNC: NEGATIVE MG/DL
ALT SERPL W P-5'-P-CCNC: 23 U/L (ref 0–50)
APPEARANCE UR: CLEAR
AST SERPL W P-5'-P-CCNC: 18 U/L (ref 0–45)
BASOPHILS # BLD AUTO: 0.1 10E9/L (ref 0–0.2)
BASOPHILS NFR BLD AUTO: 1.3 %
BILIRUB UR QL STRIP: NEGATIVE
COLOR UR AUTO: NORMAL
CREAT SERPL-MCNC: 0.9 MG/DL (ref 0.52–1.04)
DIFFERENTIAL METHOD BLD: ABNORMAL
EOSINOPHIL # BLD AUTO: 1.4 10E9/L (ref 0–0.7)
EOSINOPHIL NFR BLD AUTO: 22.5 %
ERYTHROCYTE [DISTWIDTH] IN BLOOD BY AUTOMATED COUNT: 12.7 % (ref 10–15)
GFR SERPL CREATININE-BSD FRML MDRD: 70 ML/MIN/{1.73_M2}
GLUCOSE UR STRIP-MCNC: NEGATIVE MG/DL
HCT VFR BLD AUTO: 46.9 % (ref 35–47)
HGB BLD-MCNC: 15.9 G/DL (ref 11.7–15.7)
HGB UR QL STRIP: NEGATIVE
IMM GRANULOCYTES # BLD: 0 10E9/L (ref 0–0.4)
IMM GRANULOCYTES NFR BLD: 0.2 %
KETONES UR STRIP-MCNC: NEGATIVE MG/DL
LEUKOCYTE ESTERASE UR QL STRIP: NEGATIVE
LYMPHOCYTES # BLD AUTO: 1.4 10E9/L (ref 0.8–5.3)
LYMPHOCYTES NFR BLD AUTO: 22.2 %
MCH RBC QN AUTO: 31.2 PG (ref 26.5–33)
MCHC RBC AUTO-ENTMCNC: 33.9 G/DL (ref 31.5–36.5)
MCV RBC AUTO: 92 FL (ref 78–100)
MONOCYTES # BLD AUTO: 0.3 10E9/L (ref 0–1.3)
MONOCYTES NFR BLD AUTO: 5.5 %
NEUTROPHILS # BLD AUTO: 3 10E9/L (ref 1.6–8.3)
NEUTROPHILS NFR BLD AUTO: 48.3 %
NITRATE UR QL: NEGATIVE
NRBC # BLD AUTO: 0 10*3/UL
NRBC BLD AUTO-RTO: 0 /100
PH UR STRIP: 7 PH (ref 5–7)
PLATELET # BLD AUTO: 225 10E9/L (ref 150–450)
RBC # BLD AUTO: 5.1 10E12/L (ref 3.8–5.2)
RBC #/AREA URNS AUTO: 0 /HPF (ref 0–2)
SOURCE: NORMAL
SP GR UR STRIP: 1 (ref 1–1.03)
TSH SERPL DL<=0.005 MIU/L-ACNC: 1.41 MU/L (ref 0.4–4)
UROBILINOGEN UR STRIP-MCNC: 0 MG/DL (ref 0–2)
WBC # BLD AUTO: 6.2 10E9/L (ref 4–11)
WBC #/AREA URNS AUTO: <1 /HPF (ref 0–5)

## 2020-11-03 ENCOUNTER — VIRTUAL VISIT (OUTPATIENT)
Dept: RHEUMATOLOGY | Facility: CLINIC | Age: 60
End: 2020-11-03
Attending: INTERNAL MEDICINE
Payer: COMMERCIAL

## 2020-11-03 DIAGNOSIS — Z79.60 LONG-TERM USE OF IMMUNOSUPPRESSANT MEDICATION: ICD-10-CM

## 2020-11-03 DIAGNOSIS — J45.30 MILD PERSISTENT ASTHMA WITHOUT COMPLICATION: ICD-10-CM

## 2020-11-03 DIAGNOSIS — M30.1 CHURG-STRAUSS SYNDROME (H): Primary | ICD-10-CM

## 2020-11-03 DIAGNOSIS — J45.30 MILD PERSISTENT ASTHMA, UNSPECIFIED WHETHER COMPLICATED: ICD-10-CM

## 2020-11-03 DIAGNOSIS — D72.18 CHURG-STRAUSS SYNDROME (H): Primary | ICD-10-CM

## 2020-11-03 PROCEDURE — 99214 OFFICE O/P EST MOD 30 MIN: CPT | Mod: 95 | Performed by: INTERNAL MEDICINE

## 2020-11-03 RX ORDER — AZATHIOPRINE 50 MG/1
TABLET ORAL
Qty: 45 TABLET | Refills: 1 | Status: SHIPPED | OUTPATIENT
Start: 2020-11-03 | End: 2021-05-04

## 2020-11-03 ASSESSMENT — PAIN SCALES - GENERAL: PAINLEVEL: NO PAIN (0)

## 2020-11-03 NOTE — PROGRESS NOTES
"Angelina Trinh is a 60 year old female who is being evaluated via a billable video visit.      The patient has been notified of following:     \"This video visit will be conducted via a call between you and your physician/provider. We have found that certain health care needs can be provided without the need for an in-person physical exam.  This service lets us provide the care you need with a video conversation.  If a prescription is necessary we can send it directly to your pharmacy.  If lab work is needed we can place an order for that and you can then stop by our lab to have the test done at a later time.    Video visits are billed at different rates depending on your insurance coverage.  Please reach out to your insurance provider with any questions.    If during the course of the call the physician/provider feels a video visit is not appropriate, you will not be charged for this service.\"    Patient has given verbal consent for Video visit? Yes  How would you like to obtain your AVS? MyChart  If you are dropped from the video visit, the video invite should be resent to: Send to e-mail at: [email or web address]  Will anyone else be joining your video visit? 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 This has been associated with asthma, eosinophilia (26%), chronic sinusitis and allergic rhinitis. P-ANCA positive. Treatment with Solumedrol 1 gm IV x 2 and Cyclophosphamide 1.25 gm IV infusion beginning on 12/3/2004, and discontinued after three doses.  She continues the azathioprine 50 mg daily for increases in eosinophilia. Previous bisphosphonate. She stopped HRT at age 53 (was on for 7 years).     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     She continues to do very well. We reduced her azathioprine last visit to 25 mg daily. She has gotten her flu shot. " She has her usual Asthma but nothing too severe. She continues on Advair and OTC Nasacort.     No recent numbness or weakness in the LE. She will rarely have hives. No other rashes. No eye inflammation.    Azathioprine 25 mg every day.    PMH   Medical: Asthma since  requiring high dose prednisone at times. Chronic sinusitis and allergic/eosinophillic rhinitis for many years, Scoliosis, nasal polyps, ocular migraine, GERD, tympanic membrane rupture, posterior vitreous detachment right eye, osteopenia (T = -1.4 )  Surgery: Cervical colposcopy; T and A; nasal polypectomy  Injuries: clavical fracture, rib fracture.      Family Hx   No asthma or inflammatory disease.  Father with type II diabetes, adult onset hydrocephalus, TIA/CVA, osteoarthritis, dementia  Brother and mother both  with brain tumor.   Aunt with bone cancer.   Sister with Grave's disease.  Sister with premature irritable bowel OA, HTN, and PVCs  Paternal Aunt with PMR, osteoporosis.  Great grandmother with RA.     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   no children. No EtOH or tobacco. Right handed.    PMSF history personally obtained and updated by me today.     ROS:  +minor osteoarthritis in her hands  +Allergy to PCN and SULFA  Remainder of the 14 point ROS obtained and found negative.     Physical Exam   /68, O2 sat 97%, p82, weight 135#  Constitutional: WD-WN-WG cooperative, NAD  Eyes: PERRLA, nl sclera  ENT: mouth clear, normal lips  MS: +modest squaring at the base of both thumbs; All other neck, shoulder, elbow, wrist, hand joints were examined and otherwise found normal. No active synovitis or deformity. Normal tuck and prayer.  Skin: no rashes or lesions  Psych: nl judgement, affect.    Laboratory:    No recent    Impression:    EGPA/Churg-Kip Syndrome-associated with severe asthma and eosinophilia. Recently her asthma has  been well controlled and l appreciate no signs of active systemic inflammation. Good tolerance of the azathioprine and we will continue this low dose.    Nasal polyps-stable on fluticasone.    Asthma-stable on advair.    Long term monitoring of immune suppression-with increased risk for infection. Plan lab testing today and every 3 months. We also agree that the benefits of continued immunosuppression outweigh the risks of COVID-19 infection.     Plan RTC in 6 months.        Video-Visit Details    Type of service:  Video Visit    Video Start Time: 9:01 AM  Video End Time: 9:17 AM    Originating Location (pt. Location): Home    Distant Location (provider location):  [facility] [place]     Platform used for Video Visit: Radha Mendieta MD

## 2020-11-03 NOTE — LETTER
"11/3/2020       RE: Angelina Trinh  [address] [place] [phone]     Dear Colleague,    Thank you for referring your patient, Angelina Trinh, to the [facility] [place] [facility]. Please see a copy of my visit note below.    Angelina Trinh is a 60 year old female who is being evaluated via a billable video visit.      The patient has been notified of following:     \"This video visit will be conducted via a call between you and your physician/provider. We have found that certain health care needs can be provided without the need for an in-person physical exam.  This service lets us provide the care you need with a video conversation.  If a prescription is necessary we can send it directly to your pharmacy.  If lab work is needed we can place an order for that and you can then stop by our lab to have the test done at a later time.    Video visits are billed at different rates depending on your insurance coverage.  Please reach out to your insurance provider with any questions.    If during the course of the call the physician/provider feels a video visit is not appropriate, you will not be charged for this service.\"    Patient has given verbal consent for Video visit? Yes  How would you like to obtain your AVS? MyChart  If you are dropped from the video visit, the video invite should be resent to: Send to e-mail at: [email or web address]  Will anyone else be joining your video visit? 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 This has been associated with asthma, eosinophilia (26%), chronic sinusitis and allergic rhinitis. P-ANCA positive. Treatment with Solumedrol 1 gm IV x 2 and Cyclophosphamide 1.25 gm IV infusion beginning on 12/3/2004, and discontinued after three doses.  She continues the azathioprine 50 mg daily " for increases in eosinophilia. Previous bisphosphonate. She stopped HRT at age 53 (was on for 7 years).     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     She continues to do very well. We reduced her azathioprine last visit to 25 mg daily. She has gotten her flu shot. She has her usual Asthma but nothing too severe. She continues on Advair and OTC Nasacort.     No recent numbness or weakness in the LE. She will rarely have hives. No other rashes. No eye inflammation.    Azathioprine 25 mg every day.    PMH   Medical: Asthma since  requiring high dose prednisone at times. Chronic sinusitis and allergic/eosinophillic rhinitis for many years, Scoliosis, nasal polyps, ocular migraine, GERD, tympanic membrane rupture, posterior vitreous detachment right eye, osteopenia (T = -1.4 )  Surgery: Cervical colposcopy; T and A; nasal polypectomy  Injuries: clavical fracture, rib fracture.      Family Hx   No asthma or inflammatory disease.  Father with type II diabetes, adult onset hydrocephalus, TIA/CVA, osteoarthritis, dementia  Brother and mother both  with brain tumor.   Aunt with bone cancer.   Sister with Grave's disease.  Sister with premature irritable bowel OA, HTN, and PVCs  Paternal Aunt with PMR, osteoporosis.  Great grandmother with RA.     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   no children. No EtOH or tobacco. Right handed.    PMSF history personally obtained and updated by me today.     ROS:  +minor osteoarthritis in her hands  +Allergy to PCN and SULFA  Remainder of the 14 point ROS obtained and found negative.     Physical Exam   /68, O2 sat 97%, p82, weight 135#  Constitutional: WD-WN-WG cooperative, NAD  Eyes: PERRLA, nl sclera  ENT: mouth clear, normal lips  MS: +modest squaring at the base of both thumbs; All other neck, shoulder, elbow, wrist,  hand joints were examined and otherwise found normal. No active synovitis or deformity. Normal tuck and prayer.  Skin: no rashes or lesions  Psych: nl judgement, affect.    Laboratory:    No recent    Impression:    EGPA/Churg-Kip Syndrome-associated with severe asthma and eosinophilia. Recently her asthma has been well controlled and l appreciate no signs of active systemic inflammation. Good tolerance of the azathioprine and we will continue this low dose.    Nasal polyps-stable on fluticasone.    Asthma-stable on advair.    Long term monitoring of immune suppression-with increased risk for infection. Plan lab testing today and every 3 months. We also agree that the benefits of continued immunosuppression outweigh the risks of COVID-19 infection.     Plan RTC in 6 months.        Video-Visit Details    Type of service:  Video Visit    Video Start Time: 9:01 AM  Video End Time: 9:17 AM    Originating Location (pt. Location): Home    Distant Location (provider location):  [facility] [place]     Platform used for Video Visit: Radha Mendieta MD

## 2020-11-03 NOTE — PATIENT INSTRUCTIONS
Continue your current medications  Get laboratory testing now and every 3 months  Follow up with me in 6 months

## 2020-11-29 ENCOUNTER — HEALTH MAINTENANCE LETTER (OUTPATIENT)
Age: 60
End: 2020-11-29

## 2021-04-13 ENCOUNTER — DOCUMENTATION ONLY (OUTPATIENT)
Dept: CARE COORDINATION | Facility: CLINIC | Age: 61
End: 2021-04-13

## 2021-05-04 ENCOUNTER — VIRTUAL VISIT (OUTPATIENT)
Dept: RHEUMATOLOGY | Facility: CLINIC | Age: 61
End: 2021-05-04
Attending: INTERNAL MEDICINE
Payer: COMMERCIAL

## 2021-05-04 DIAGNOSIS — J45.30 MILD PERSISTENT ASTHMA, UNSPECIFIED WHETHER COMPLICATED: ICD-10-CM

## 2021-05-04 DIAGNOSIS — Z79.60 LONG-TERM USE OF IMMUNOSUPPRESSANT MEDICATION: Primary | ICD-10-CM

## 2021-05-04 DIAGNOSIS — M30.1 CHURG-STRAUSS SYNDROME (H): ICD-10-CM

## 2021-05-04 DIAGNOSIS — D72.18 CHURG-STRAUSS SYNDROME (H): ICD-10-CM

## 2021-05-04 DIAGNOSIS — J45.30 MILD PERSISTENT ASTHMA WITHOUT COMPLICATION: ICD-10-CM

## 2021-05-04 DIAGNOSIS — Z79.60 LONG-TERM USE OF IMMUNOSUPPRESSANT MEDICATION: ICD-10-CM

## 2021-05-04 LAB
ALBUMIN SERPL-MCNC: 4.1 G/DL (ref 3.4–5)
ALBUMIN UR-MCNC: NEGATIVE MG/DL
ALT SERPL W P-5'-P-CCNC: 21 U/L (ref 0–50)
APPEARANCE UR: CLEAR
AST SERPL W P-5'-P-CCNC: 17 U/L (ref 0–45)
BASOPHILS # BLD AUTO: 0.1 10E9/L (ref 0–0.2)
BASOPHILS NFR BLD AUTO: 1.2 %
BILIRUB UR QL STRIP: NEGATIVE
COLOR UR AUTO: NORMAL
CREAT SERPL-MCNC: 0.97 MG/DL (ref 0.52–1.04)
CRP SERPL-MCNC: <2.9 MG/L (ref 0–8)
DIFFERENTIAL METHOD BLD: NORMAL
EOSINOPHIL # BLD AUTO: 0.7 10E9/L (ref 0–0.7)
EOSINOPHIL NFR BLD AUTO: 9.4 %
ERYTHROCYTE [DISTWIDTH] IN BLOOD BY AUTOMATED COUNT: 12.9 % (ref 10–15)
GFR SERPL CREATININE-BSD FRML MDRD: 63 ML/MIN/{1.73_M2}
GLUCOSE UR STRIP-MCNC: NEGATIVE MG/DL
HCT VFR BLD AUTO: 46.6 % (ref 35–47)
HGB BLD-MCNC: 15.7 G/DL (ref 11.7–15.7)
HGB UR QL STRIP: NEGATIVE
IMM GRANULOCYTES # BLD: 0 10E9/L (ref 0–0.4)
IMM GRANULOCYTES NFR BLD: 0.3 %
KETONES UR STRIP-MCNC: NEGATIVE MG/DL
LEUKOCYTE ESTERASE UR QL STRIP: NEGATIVE
LYMPHOCYTES # BLD AUTO: 1.6 10E9/L (ref 0.8–5.3)
LYMPHOCYTES NFR BLD AUTO: 22 %
MCH RBC QN AUTO: 31 PG (ref 26.5–33)
MCHC RBC AUTO-ENTMCNC: 33.7 G/DL (ref 31.5–36.5)
MCV RBC AUTO: 92 FL (ref 78–100)
MONOCYTES # BLD AUTO: 0.4 10E9/L (ref 0–1.3)
MONOCYTES NFR BLD AUTO: 5.8 %
NEUTROPHILS # BLD AUTO: 4.6 10E9/L (ref 1.6–8.3)
NEUTROPHILS NFR BLD AUTO: 61.3 %
NITRATE UR QL: NEGATIVE
NRBC # BLD AUTO: 0 10*3/UL
NRBC BLD AUTO-RTO: 0 /100
PH UR STRIP: 5 PH (ref 5–7)
PLATELET # BLD AUTO: 245 10E9/L (ref 150–450)
RBC # BLD AUTO: 5.07 10E12/L (ref 3.8–5.2)
RBC #/AREA URNS AUTO: 0 /HPF (ref 0–2)
SOURCE: NORMAL
SP GR UR STRIP: 1.01 (ref 1–1.03)
UROBILINOGEN UR STRIP-MCNC: 0 MG/DL (ref 0–2)
WBC # BLD AUTO: 7.5 10E9/L (ref 4–11)
WBC #/AREA URNS AUTO: 1 /HPF (ref 0–5)

## 2021-05-04 PROCEDURE — 99214 OFFICE O/P EST MOD 30 MIN: CPT | Mod: 95 | Performed by: INTERNAL MEDICINE

## 2021-05-04 PROCEDURE — 36415 COLL VENOUS BLD VENIPUNCTURE: CPT | Performed by: PATHOLOGY

## 2021-05-04 PROCEDURE — 81001 URINALYSIS AUTO W/SCOPE: CPT | Performed by: PATHOLOGY

## 2021-05-04 PROCEDURE — 86140 C-REACTIVE PROTEIN: CPT | Performed by: PATHOLOGY

## 2021-05-04 PROCEDURE — 84450 TRANSFERASE (AST) (SGOT): CPT | Performed by: PATHOLOGY

## 2021-05-04 PROCEDURE — 82565 ASSAY OF CREATININE: CPT | Performed by: PATHOLOGY

## 2021-05-04 PROCEDURE — 85025 COMPLETE CBC W/AUTO DIFF WBC: CPT | Performed by: PATHOLOGY

## 2021-05-04 PROCEDURE — 82040 ASSAY OF SERUM ALBUMIN: CPT | Performed by: PATHOLOGY

## 2021-05-04 PROCEDURE — 84460 ALANINE AMINO (ALT) (SGPT): CPT | Performed by: PATHOLOGY

## 2021-05-04 RX ORDER — AZATHIOPRINE 50 MG/1
TABLET ORAL
Qty: 45 TABLET | Refills: 1 | Status: SHIPPED | OUTPATIENT
Start: 2021-05-04

## 2021-05-04 RX ORDER — TRIAMCINOLONE ACETONIDE 55 UG/1
2 SPRAY, METERED NASAL DAILY
COMMUNITY

## 2021-05-04 ASSESSMENT — PAIN SCALES - GENERAL: PAINLEVEL: MILD PAIN (2)

## 2021-05-04 NOTE — PROGRESS NOTES
Angelina is a 60 year old who is being evaluated via a billable video visit.      How would you like to obtain your AVS? MyChart  If the video visit is dropped, the invitation should be resent by: Text to cell phone: [phone]  Will anyone else be joining your video visit? No      Video Start Time: 9:10 AM      Ms. 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 This has been associated with asthma, eosinophilia (26%), chronic sinusitis and allergic rhinitis. P-ANCA positive. Treatment with Solumedrol 1 gm IV x 2 and Cyclophosphamide 1.25 gm IV infusion beginning on 12/3/2004, and discontinued after three doses.  She continues the azathioprine 50 mg daily for increases in eosinophilia. Previous bisphosphonate. She stopped HRT at age 53 (was on for 7 years).     She is doing very well and her breathing is fine. No evidence of relapse to speak of. She still has an occasional skin hive on her leg or feet. Her neuropathy is stable. No important joint pains. AM stiffness is nil.    Azathioprine 50 mg every day is well tolerated and no infection to report. She is off of prednisone.     PMH   Medical: Asthma since  requiring high dose prednisone at times. Chronic sinusitis and allergic/eosinophillic rhinitis for many years, Scoliosis, nasal polyps, ocular migraine, GERD, tympanic membrane rupture, posterior vitreous detachment right eye, osteopenia (T = -1.4 10-)  Surgery: Cervical colposcopy; T and A; nasal polypectomy  Injuries: clavical fracture, rib fracture.      Family Hx   No asthma or inflammatory disease.  Father with type II diabetes, adult onset hydrocephalus, TIA/CVA, osteoarthritis, dementia  Brother and mother both  with brain tumor.   Aunt with bone cancer.   Sister with Grave's disease.  Sister with premature irritable bowel OA, HTN, and PVCs  Paternal Aunt with PMR, osteoporosis.  Great grandmother with  RA.     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   no children. No EtOH or tobacco. Right handed.    PMSF history personally obtained and updated by me today.     ROS:  +minor osteoarthritis in her hands  +Allergy to PCN and SULFA  Remainder of the 14 point ROS obtained and found negative.     Physical Exam   /75, O2 sat 97%, p82, weight 135#  Constitutional: WD-WN-WG cooperative, NAD  Eyes: PERRLA, nl sclera  ENT: mouth clear, normal lips  MS: +modest squaring at the base of both thumbs; modest 5th left katya's swelling. All other neck, shoulder, elbow, wrist, hand joints were examined and otherwise found normal. No active synovitis or deformity. Normal tuck and prayer.  Skin: no rashes or lesions  Psych: nl judgement, affect.    Laboratory:    No recent    Impression:    EGPA/Churg-Kip Syndrome-associated with severe asthma and eosinophilia. This continues to be well controlled today with use of low dose azathioprine and she is treated to target. Although there may be theoretical advantage to use of anti-IL5 therapy, given her good disease control I cannot recommend this today.    Nasal polyps-stable on fluticasone.    Osteoarthritis-modest in the hands. I recommend tylenol or topical voltaren gel for pain control.    Asthma-stable on advair.    Long term monitoring of immune suppression-with increased risk for infection. Plan lab testing today and every 3 months. We also agree that the benefits of continued immunosuppression outweigh the risks of COVID-19 infection.     Plan RTC in 6 months in person.      A total of 30 minutes was spent in face to face patient interaction and chart review on the day of service.            Video-Visit Details    Type of service:  Video Visit    Video End Time:9:39 AM    Originating Location (pt. Location): Home    Distant Location (provider location):  [facility]  RHEUMATOLOGY CLINIC [place]     Platform used for Video Visit: Radha

## 2021-05-04 NOTE — LETTER
5/4/2021       RE: Angelina Trinh  [address] [place] [id number]     Dear Colleague,    Thank you for referring your patient, Angelina Trinh, to the [facility] [place] at [facility]. Please see a copy of my visit note below.    Angelina is a 60 year old who is being evaluated via a billable video visit.      How would you like to obtain your AVS? MyChart  If the video visit is dropped, the invitation should be resent by: Text to cell phone: [phone]  Will anyone else be joining your video visit? No      Video Start Time: 9:10 AM      Ms. 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 This has been associated with asthma, eosinophilia (26%), chronic sinusitis and allergic rhinitis. P-ANCA positive. Treatment with Solumedrol 1 gm IV x 2 and Cyclophosphamide 1.25 gm IV infusion beginning on 12/3/2004, and discontinued after three doses.  She continues the azathioprine 50 mg daily for increases in eosinophilia. Previous bisphosphonate. She stopped HRT at age 53 (was on for 7 years).     She is doing very well and her breathing is fine. No evidence of relapse to speak of. She still has an occasional skin hive on her leg or feet. Her neuropathy is stable. No important joint pains. AM stiffness is nil.    Azathioprine 50 mg every day is well tolerated and no infection to report. She is off of prednisone.     PMH   Medical: Asthma since 2003 requiring high dose prednisone at times. Chronic sinusitis and allergic/eosinophillic rhinitis for many years, Scoliosis, nasal polyps, ocular migraine, GERD, tympanic membrane rupture, posterior vitreous detachment right eye, osteopenia (T = -1.4 )  Surgery: Cervical colposcopy; T and A; nasal polypectomy  Injuries: clavical fracture, rib fracture.      Family Hx   No asthma or inflammatory  disease.  Father with type II diabetes, adult onset hydrocephalus, TIA/CVA, osteoarthritis, dementia  Brother and mother both  with brain tumor.   Aunt with bone cancer.   Sister with Grave's disease.  Sister with premature irritable bowel OA, HTN, and PVCs  Paternal Aunt with PMR, osteoporosis.  Great grandmother with RA.     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   no children. No EtOH or tobacco. Right handed.    PMSF history personally obtained and updated by me today.     ROS:  +minor osteoarthritis in her hands  +Allergy to PCN and SULFA  Remainder of the 14 point ROS obtained and found negative.     Physical Exam   /75, O2 sat 97%, p82, weight 135#  Constitutional: WD-WN-WG cooperative, NAD  Eyes: PERRLA, nl sclera  ENT: mouth clear, normal lips  MS: +modest squaring at the base of both thumbs; modest 5th left katya's swelling. All other neck, shoulder, elbow, wrist, hand joints were examined and otherwise found normal. No active synovitis or deformity. Normal tuck and prayer.  Skin: no rashes or lesions  Psych: nl judgement, affect.    Laboratory:    No recent    Impression:    EGPA/Churg-Kip Syndrome-associated with severe asthma and eosinophilia. This continues to be well controlled today with use of low dose azathioprine and she is treated to target. Although there may be theoretical advantage to use of anti-IL5 therapy, given her good disease control I cannot recommend this today.    Nasal polyps-stable on fluticasone.    Osteoarthritis-modest in the hands. I recommend tylenol or topical voltaren gel for pain control.    Asthma-stable on advair.    Long term monitoring of immune suppression-with increased risk for infection. Plan lab testing today and every 3 months. We also agree that the benefits of continued immunosuppression outweigh the risks of COVID-19 infection.     Plan RTC in 6  months in person.      A total of 30 minutes was spent in face to face patient interaction and chart review on the day of service.            Video-Visit Details    Type of service:  Video Visit    Video End Time:9:39 AM    Originating Location (pt. Location): Home    Distant Location (provider location):  [facility]     Platform used for Video Visit: [employer]    Again, thank you for allowing me to participate in the care of your patient.      Sincerely,    Master Mendieta MD       yesterday

## 2021-05-04 NOTE — PATIENT INSTRUCTIONS
Continue your current medications  Get laboratory testing now and every 3 months  May add tylenol or topical voltaren gel for hand osteoarthritis pain  Follow up with me in 6 months in person

## 2021-09-19 ENCOUNTER — HEALTH MAINTENANCE LETTER (OUTPATIENT)
Age: 61
End: 2021-09-19

## 2022-01-09 ENCOUNTER — HEALTH MAINTENANCE LETTER (OUTPATIENT)
Age: 62
End: 2022-01-09

## 2022-03-06 ENCOUNTER — HEALTH MAINTENANCE LETTER (OUTPATIENT)
Age: 62
End: 2022-03-06

## 2022-11-21 ENCOUNTER — HEALTH MAINTENANCE LETTER (OUTPATIENT)
Age: 62
End: 2022-11-21

## 2023-04-16 ENCOUNTER — HEALTH MAINTENANCE LETTER (OUTPATIENT)
Age: 63
End: 2023-04-16

## 2024-04-14 ENCOUNTER — HEALTH MAINTENANCE LETTER (OUTPATIENT)
Age: 64
End: 2024-04-14